# Patient Record
Sex: FEMALE | Race: WHITE | NOT HISPANIC OR LATINO | Employment: OTHER | URBAN - METROPOLITAN AREA
[De-identification: names, ages, dates, MRNs, and addresses within clinical notes are randomized per-mention and may not be internally consistent; named-entity substitution may affect disease eponyms.]

---

## 2023-12-21 ENCOUNTER — OFFICE VISIT (OUTPATIENT)
Dept: PRIMARY CARE | Facility: CLINIC | Age: 33
End: 2023-12-21
Payer: MEDICAID

## 2023-12-21 ENCOUNTER — TELEPHONE (OUTPATIENT)
Dept: PRIMARY CARE | Facility: CLINIC | Age: 33
End: 2023-12-21

## 2023-12-21 VITALS
OXYGEN SATURATION: 97 % | TEMPERATURE: 97.2 F | BODY MASS INDEX: 31.25 KG/M2 | SYSTOLIC BLOOD PRESSURE: 122 MMHG | HEIGHT: 69 IN | HEART RATE: 76 BPM | DIASTOLIC BLOOD PRESSURE: 80 MMHG | RESPIRATION RATE: 15 BRPM | WEIGHT: 211 LBS

## 2023-12-21 DIAGNOSIS — Z00.00 ROUTINE ADULT HEALTH MAINTENANCE: ICD-10-CM

## 2023-12-21 DIAGNOSIS — Z87.828 HISTORY OF TRAUMATIC HEAD INJURY: ICD-10-CM

## 2023-12-21 DIAGNOSIS — R41.3 MEMORY LOSS: Primary | ICD-10-CM

## 2023-12-21 PROCEDURE — 1036F TOBACCO NON-USER: CPT | Performed by: FAMILY MEDICINE

## 2023-12-21 PROCEDURE — 99385 PREV VISIT NEW AGE 18-39: CPT | Performed by: FAMILY MEDICINE

## 2023-12-21 NOTE — TELEPHONE ENCOUNTER
----- Message from Balwinder Valencia MD sent at 12/21/2023 11:06 AM EST -----  Please tell pt that I did some research on traumatic brain injury and she might try curcumin 1000 mg daily, B12 1000 mcg daily, Vit D 3000 U daily, zinc daily to see if it helps with memory.  Also might consider COQ-10 as well.

## 2023-12-21 NOTE — PROGRESS NOTES
Subjective   Patient ID: Ruthann Nichole is a 33 y.o. female who presents for New Patient Visit (Discuss memory loss,headaches x a few yrs, pt was kicked in head when young by horse ).  HPI patient presents to [facility] care.  She has a number of issues.  Most importantly she reports that she has been having some memory loss in recent years.  Seems to be getting worse.  She has history of getting kicked in the head by a horse when she was a teenager and has had multiple falls and head traumas since then.  She wears a helmet when she rides these days but works as a .  She reports her mood is overall good.  It is hard however when she forgets things that she needs to do and e patient also reports that her specially things that her daughter asked her to do.    Left shoulder gets loose at times.  There are multiple times when she has had it dislocated and she placed it back in place herself.    Obesity: Patient is try to lose weight.  She is very active and tries to eat a healthy diet.    There is no problem list on file for this patient.      Social Connections: Not on file       No current outpatient medications on file prior to visit.     No current facility-administered medications on file prior to visit.        Vitals:    12/21/23 1005   BP: 122/80   Pulse: 76   Resp: 15   Temp: 36.2 °C (97.2 °F)   SpO2: 97%     Vitals:    12/21/23 1005   Weight: 95.7 kg (211 lb)       Review of Systems   All other systems reviewed and are negative.      Objective     Physical Exam  Vitals reviewed.   Constitutional:       General: She is not in acute distress.     Appearance: Normal appearance. She is well-developed. She is not diaphoretic.   HENT:      Head: Normocephalic and atraumatic.      Right Ear: Tympanic membrane normal.      Left Ear: Tympanic membrane normal.      Nose: Nose normal.      Mouth/Throat:      Mouth: Mucous membranes are moist.   Eyes:      Pupils: Pupils are equal, round, and reactive to light.    Cardiovascular:      Rate and Rhythm: Normal rate and regular rhythm.      Heart sounds: Normal heart sounds. No murmur heard.     No friction rub. No gallop.   Pulmonary:      Effort: Pulmonary effort is normal.      Breath sounds: Normal breath sounds. No rales.   Abdominal:      General: Bowel sounds are normal.      Palpations: Abdomen is soft.      Tenderness: There is no abdominal tenderness.   Musculoskeletal:      Cervical back: Normal range of motion and neck supple.   Skin:     General: Skin is warm and dry.   Neurological:      Mental Status: She is alert.   Psychiatric:         Mood and Affect: Mood normal.         No visits with results within 2 Month(s) from this visit.   Latest known visit with results is:   [facility]acy Encounter on 06/02/2020   Component Date Value Ref Range Status    Joel Score 06/02/2020 0   Final    Comment: Interpretation of the Joel Score  0-3.....Normal vaginal microbiota  4-6.....Intermediate results  7-10....Bacterial vaginosis      Yeast 06/02/2020 ABSENT   Final    Clue Cells 06/02/2020 ABSENT   Final    Pathology Report 06/02/2020    Final                    Value:    Accession #: [id number]     Date of Procedure:  6/2/2020       Pathologist: [facility], Cytology  Date Reported: 6/8/2020  Date Received:  6/3/2020  Submitting Physician: Gabbi Knowles CNP                    FINAL CYTOLOGICAL INTERPRETATION        A.  THINPREP PAP Cervical / Endocervical Reflex - [facility] only:              Specimen adequacy:       SATISFACTORY FOR EVALUATION.       Quality Indicator: Endocervical/transformation zone component is present.              General Categorization:       NEGATIVE FOR INTRAEPITHELIAL LESION OR MALIGNANCY.              Ancillary Testing:       Specimen does not meet the requisition-stated criteria for HPV testing.  See Pap test interpretation above.         Electronically Signed Out By [facility], Cytology//LSM   By the signature  on this report, the individual or group listed as making the  Final Interpretation/Diagnosis certifies that they have reviewed this case.  Educational Note:  Cervical cytology i                          s a screening procedure primarily for squamous cancers and  precursors and has associated false-negative and false-positive results as  evidenced by published data.  Your patient's test should be interpreted in this  context, together with patient's history and clinical findings.  Regular  sampling and follow-up of unexplained clinical signs and symptoms are  recommended to minimize false negative results.         Clinical History  Date of Last Menstrual Period:     4/15/2020    Other Clinical Conditions:  HPV  Reflex for ASC-US only - Exclude HPV Genotype       Source of Specimen  A: THINPREP PAP Cervical / Endocervical Reflex - [facility] only            [facility]  Department of Pathology   [address] [place] [id number]        CONVERTED FINAL DIAGNOSIS 06/02/2020    Final                    Value:A.  THINPREP PAP Cervical / Endocervical Reflex - [facility] only:              Specimen adequacy:       SATISFACTORY FOR EVALUATION.       Quality Indicator: Endocervical/transformation zone component is present.              General Categorization:       NEGATIVE FOR INTRAEPITHELIAL LESION OR MALIGNANCY.              Ancillary Testing:       Specimen does not meet the requisition-stated criteria for HPV testing.  See Pap test interpretation above.             CONVERTED FINAL REPORT PDF LINK TO* 06/02/2020 [id number]   Final       Assessment/Plan   Problem List Items Addressed This Visit    None  Visit Diagnoses         Codes    Memory loss    -  Primary R41.3    Relevant Orders    CT head wo IV contrast    Referral to Physical Therapy    Comprehensive Metabolic Panel    Lipid Panel    CBC and Auto Differential    Vitamin B12    Vitamin D  25-Hydroxy,Total (for eval of Vitamin D levels)    TSH with reflex to Free T4 if abnormal    Magnesium    History of traumatic head injury     Z87.828    Relevant Orders    CT head wo IV contrast    Referral to Physical Therapy    Comprehensive Metabolic Panel    Lipid Panel    CBC and Auto Differential    Vitamin B12    Vitamin D 25-Hydroxy,Total (for eval of Vitamin D levels)    TSH with reflex to Free T4 if abnormal    Magnesium    Routine adult health maintenance     Z00.00    Relevant Orders    CT head wo IV contrast    Referral to Physical Therapy    Comprehensive Metabolic Panel    Lipid Panel    CBC and Auto Differential    Vitamin B12    Vitamin D 25-Hydroxy,Total (for eval of Vitamin D levels)    TSH with reflex to Free T4 if abnormal    Magnesium          Ordering CT scan of patient's head.  Concern for residual effects from traumatic brain injury.  Encouraged pt to consider finding a different kind of occupation which would limit her risks.   Ref to PT for shoulder.  Aim for 1700 ian strict diet x 6 days a week.      Try curcumin 1000 mg daily, B12 1000 mcg daily, Vit D 3000 U daily, zinc daily to see if it helps with memory.    Fu in 6 mo

## 2023-12-26 ENCOUNTER — LAB (OUTPATIENT)
Dept: LAB | Facility: LAB | Age: 33
End: 2023-12-26
Payer: MEDICAID

## 2023-12-26 DIAGNOSIS — Z00.00 ROUTINE ADULT HEALTH MAINTENANCE: ICD-10-CM

## 2023-12-26 DIAGNOSIS — R41.3 MEMORY LOSS: ICD-10-CM

## 2023-12-26 DIAGNOSIS — Z87.828 HISTORY OF TRAUMATIC HEAD INJURY: ICD-10-CM

## 2023-12-26 LAB
25(OH)D3 SERPL-MCNC: 23 NG/ML (ref 30–100)
ALBUMIN SERPL BCP-MCNC: 4.5 G/DL (ref 3.4–5)
ALP SERPL-CCNC: 68 U/L (ref 33–110)
ALT SERPL W P-5'-P-CCNC: 32 U/L (ref 7–45)
ANION GAP SERPL CALC-SCNC: 11 MMOL/L (ref 10–20)
AST SERPL W P-5'-P-CCNC: 23 U/L (ref 9–39)
BASOPHILS # BLD AUTO: 0.04 X10*3/UL (ref 0–0.1)
BASOPHILS NFR BLD AUTO: 0.7 %
BILIRUB SERPL-MCNC: 0.5 MG/DL (ref 0–1.2)
BUN SERPL-MCNC: 12 MG/DL (ref 6–23)
CALCIUM SERPL-MCNC: 9.5 MG/DL (ref 8.6–10.3)
CHLORIDE SERPL-SCNC: 104 MMOL/L (ref 98–107)
CHOLEST SERPL-MCNC: 230 MG/DL (ref 0–199)
CHOLESTEROL/HDL RATIO: 3.2
CO2 SERPL-SCNC: 26 MMOL/L (ref 21–32)
CREAT SERPL-MCNC: 0.81 MG/DL (ref 0.5–1.05)
EOSINOPHIL # BLD AUTO: 0.14 X10*3/UL (ref 0–0.7)
EOSINOPHIL NFR BLD AUTO: 2.3 %
ERYTHROCYTE [DISTWIDTH] IN BLOOD BY AUTOMATED COUNT: 12.2 % (ref 11.5–14.5)
GFR SERPL CREATININE-BSD FRML MDRD: >90 ML/MIN/1.73M*2
GLUCOSE SERPL-MCNC: 81 MG/DL (ref 74–99)
HCT VFR BLD AUTO: 43.3 % (ref 36–46)
HDLC SERPL-MCNC: 72.7 MG/DL
HGB BLD-MCNC: 13.8 G/DL (ref 12–16)
IMM GRANULOCYTES # BLD AUTO: 0.02 X10*3/UL (ref 0–0.7)
IMM GRANULOCYTES NFR BLD AUTO: 0.3 % (ref 0–0.9)
LDLC SERPL CALC-MCNC: 143 MG/DL
LYMPHOCYTES # BLD AUTO: 2.13 X10*3/UL (ref 1.2–4.8)
LYMPHOCYTES NFR BLD AUTO: 34.9 %
MAGNESIUM SERPL-MCNC: 2.1 MG/DL (ref 1.6–2.4)
MCH RBC QN AUTO: 29.8 PG (ref 26–34)
MCHC RBC AUTO-ENTMCNC: 31.9 G/DL (ref 32–36)
MCV RBC AUTO: 94 FL (ref 80–100)
MONOCYTES # BLD AUTO: 0.34 X10*3/UL (ref 0.1–1)
MONOCYTES NFR BLD AUTO: 5.6 %
NEUTROPHILS # BLD AUTO: 3.44 X10*3/UL (ref 1.2–7.7)
NEUTROPHILS NFR BLD AUTO: 56.2 %
NON HDL CHOLESTEROL: 157 MG/DL (ref 0–149)
NRBC BLD-RTO: 0 /100 WBCS (ref 0–0)
PLATELET # BLD AUTO: 218 X10*3/UL (ref 150–450)
POTASSIUM SERPL-SCNC: 4.5 MMOL/L (ref 3.5–5.3)
PROT SERPL-MCNC: 7 G/DL (ref 6.4–8.2)
RBC # BLD AUTO: 4.63 X10*6/UL (ref 4–5.2)
SODIUM SERPL-SCNC: 136 MMOL/L (ref 136–145)
TRIGL SERPL-MCNC: 70 MG/DL (ref 0–149)
TSH SERPL-ACNC: 1.82 MIU/L (ref 0.44–3.98)
VIT B12 SERPL-MCNC: 398 PG/ML (ref 211–911)
VLDL: 14 MG/DL (ref 0–40)
WBC # BLD AUTO: 6.1 X10*3/UL (ref 4.4–11.3)

## 2023-12-26 PROCEDURE — 36415 COLL VENOUS BLD VENIPUNCTURE: CPT

## 2023-12-26 PROCEDURE — 80053 COMPREHEN METABOLIC PANEL: CPT

## 2023-12-26 PROCEDURE — 80061 LIPID PANEL: CPT

## 2023-12-26 PROCEDURE — 84443 ASSAY THYROID STIM HORMONE: CPT

## 2023-12-26 PROCEDURE — 83735 ASSAY OF MAGNESIUM: CPT

## 2023-12-26 PROCEDURE — 85025 COMPLETE CBC W/AUTO DIFF WBC: CPT

## 2023-12-26 PROCEDURE — 82607 VITAMIN B-12: CPT

## 2023-12-26 PROCEDURE — 82306 VITAMIN D 25 HYDROXY: CPT

## 2023-12-26 NOTE — RESULT ENCOUNTER NOTE
Vit D levels are a bit low.  She can take 2000U daily.  Lipids are a bit high as well.  Losing weight would help improve those.  Other things look ok.

## 2023-12-27 ENCOUNTER — TELEPHONE (OUTPATIENT)
Dept: PRIMARY CARE | Facility: CLINIC | Age: 33
End: 2023-12-27
Payer: MEDICAID

## 2023-12-27 NOTE — TELEPHONE ENCOUNTER
----- Message from Balwinder Valencia MD sent at 12/26/2023  4:00 PM EST -----  Vit D levels are a bit low.  She can take 2000U daily.  Lipids are a bit high as well.  Losing weight would help improve those.  Other things look ok.

## 2023-12-27 NOTE — TELEPHONE ENCOUNTER
I informed pt of the bw res, she wanted to know if she should wait to take the meds that were prescribed or should she wait until after her CT scan, PT aware KAYLA out of office this week. Lazaro

## 2024-01-11 ENCOUNTER — TELEPHONE (OUTPATIENT)
Dept: PRIMARY CARE | Facility: CLINIC | Age: 34
End: 2024-01-11
Payer: MEDICAID

## 2024-01-11 ENCOUNTER — ANCILLARY PROCEDURE (OUTPATIENT)
Dept: RADIOLOGY | Facility: CLINIC | Age: 34
End: 2024-01-11
Payer: MEDICAID

## 2024-01-11 DIAGNOSIS — Z00.00 ROUTINE ADULT HEALTH MAINTENANCE: ICD-10-CM

## 2024-01-11 DIAGNOSIS — Z87.828 HISTORY OF TRAUMATIC HEAD INJURY: ICD-10-CM

## 2024-01-11 DIAGNOSIS — R41.3 MEMORY LOSS: ICD-10-CM

## 2024-01-11 PROCEDURE — 70450 CT HEAD/BRAIN W/O DYE: CPT | Performed by: RADIOLOGY

## 2024-01-11 PROCEDURE — 70450 CT HEAD/BRAIN W/O DYE: CPT

## 2024-01-11 NOTE — TELEPHONE ENCOUNTER
----- Message from Balwinder Valencia MD sent at 1/11/2024  1:13 PM EST -----  CAT scan was normal.  No abnormal findings

## 2024-03-21 ENCOUNTER — OFFICE VISIT (OUTPATIENT)
Dept: OBSTETRICS AND GYNECOLOGY | Facility: CLINIC | Age: 34
End: 2024-03-21
Payer: MEDICAID

## 2024-03-21 VITALS
BODY MASS INDEX: 32.43 KG/M2 | WEIGHT: 214 LBS | DIASTOLIC BLOOD PRESSURE: 68 MMHG | SYSTOLIC BLOOD PRESSURE: 110 MMHG | HEIGHT: 68 IN

## 2024-03-21 DIAGNOSIS — R10.2 PELVIC PAIN: Primary | ICD-10-CM

## 2024-03-21 LAB
BILIRUBIN, POC: NEGATIVE
BLOOD URINE, POC: POSITIVE
CLARITY, POC: ABNORMAL
COLOR, POC: YELLOW
GLUCOSE URINE, POC: NEGATIVE
KETONES, POC: POSITIVE
LEUKOCYTE EST, POC: NEGATIVE
NITRITE, POC: NEGATIVE
PH, POC: 7
POC APPEARANCE OF BODY FLUID: ABNORMAL
SPECIFIC GRAVITY, POC: 5
URINE PROTEIN, POC: NEGATIVE
UROBILINOGEN, POC: NEGATIVE

## 2024-03-21 PROCEDURE — 81002 URINALYSIS NONAUTO W/O SCOPE: CPT | Performed by: NURSE PRACTITIONER

## 2024-03-21 PROCEDURE — 87186 SC STD MICRODIL/AGAR DIL: CPT

## 2024-03-21 PROCEDURE — 99214 OFFICE O/P EST MOD 30 MIN: CPT | Performed by: NURSE PRACTITIONER

## 2024-03-21 PROCEDURE — 87086 URINE CULTURE/COLONY COUNT: CPT

## 2024-03-21 PROCEDURE — 1036F TOBACCO NON-USER: CPT | Performed by: NURSE PRACTITIONER

## 2024-03-21 ASSESSMENT — ENCOUNTER SYMPTOMS
MUSCULOSKELETAL NEGATIVE: 1
RESPIRATORY NEGATIVE: 1
EYES NEGATIVE: 1
ENDOCRINE NEGATIVE: 1
PSYCHIATRIC NEGATIVE: 1
GASTROINTESTINAL NEGATIVE: 1
NEUROLOGICAL NEGATIVE: 1
CARDIOVASCULAR NEGATIVE: 1
CONSTITUTIONAL NEGATIVE: 1

## 2024-03-21 NOTE — PROGRESS NOTES
Subjective   Patient ID: Ruthann Nichole is a 34 y.o. female who presents for Pelvic Pain (Complains of pelvic pain and cramping everyday for the 4 months. ).  34 year old here for complaints of having pelvic pain.  She first had the pain 4 months ago.  The pain has continued daily, but the intensity of the pain varies each day.  Sometimes the pain is a mild sharp pain, other times the pain is a more intense sharp pain and other times the pain feels like cramping when she is about to start her period, but she is not due for her period.  Her periods have been normal the last 4 months, but the pain does get more intense with the bleeding.  She denies any abnormal discharge, itching, and vaginal odor.  She has noticed her urine to be darker and to have a foul odor.  She denies any new partners,but does notice pain to intensify after she has intercourse for about 24 hours.  She also has some discomfort with intercourse but not enough that she has to stop.     Pelvic Pain  The patient's primary symptoms include pelvic pain.       Review of Systems   Constitutional: Negative.    HENT: Negative.     Eyes: Negative.    Respiratory: Negative.     Cardiovascular: Negative.    Gastrointestinal: Negative.    Endocrine: Negative.    Genitourinary:  Positive for pelvic pain.   Musculoskeletal: Negative.    Skin: Negative.    Neurological: Negative.    Psychiatric/Behavioral: Negative.         Objective   Physical Exam    Assessment/Plan   Problem List Items Addressed This Visit             ICD-10-CM    Pelvic pain - Primary R10.2    Relevant Orders    US PELVIS TRANSABDOMINAL WITH TRANSVAGINAL    Urine culture    POCT urinalysis dipstick manually resulted (Completed)   UAC&S sent  Pelvic ultrasound ordered, will treat pending results as needed  Follow up as needed       ZORAN Lizarraga 03/21/24 4:08 PM

## 2024-03-22 ENCOUNTER — TELEPHONE (OUTPATIENT)
Dept: OBSTETRICS AND GYNECOLOGY | Facility: CLINIC | Age: 34
End: 2024-03-22
Payer: MEDICAID

## 2024-03-22 NOTE — TELEPHONE ENCOUNTER
Patient reviewed an abnormal lab result in [facility], she is asking to receive a call back concerning this. Please advise

## 2024-03-24 LAB — BACTERIA UR CULT: ABNORMAL

## 2024-03-25 DIAGNOSIS — R10.2 PELVIC PAIN: Primary | ICD-10-CM

## 2024-03-25 RX ORDER — NITROFURANTOIN 25; 75 MG/1; MG/1
100 CAPSULE ORAL 2 TIMES DAILY
Qty: 14 CAPSULE | Refills: 0 | Status: SHIPPED | OUTPATIENT
Start: 2024-03-25 | End: 2024-04-01

## 2024-03-28 ENCOUNTER — HOSPITAL ENCOUNTER (OUTPATIENT)
Dept: RADIOLOGY | Facility: CLINIC | Age: 34
Discharge: HOME | End: 2024-03-28
Payer: MEDICAID

## 2024-03-28 DIAGNOSIS — R10.2 PELVIC PAIN: ICD-10-CM

## 2024-03-28 PROCEDURE — 76830 TRANSVAGINAL US NON-OB: CPT | Performed by: STUDENT IN AN ORGANIZED HEALTH CARE EDUCATION/TRAINING PROGRAM

## 2024-03-28 PROCEDURE — 76856 US EXAM PELVIC COMPLETE: CPT

## 2024-03-28 PROCEDURE — 76856 US EXAM PELVIC COMPLETE: CPT | Performed by: STUDENT IN AN ORGANIZED HEALTH CARE EDUCATION/TRAINING PROGRAM

## 2024-06-14 ENCOUNTER — TELEPHONE (OUTPATIENT)
Dept: OBSTETRICS AND GYNECOLOGY | Facility: CLINIC | Age: 34
End: 2024-06-14
Payer: MEDICAID

## 2024-06-14 DIAGNOSIS — N30.00 ACUTE CYSTITIS WITHOUT HEMATURIA: Primary | ICD-10-CM

## 2024-06-14 RX ORDER — NITROFURANTOIN 25; 75 MG/1; MG/1
100 CAPSULE ORAL 2 TIMES DAILY
Qty: 10 CAPSULE | Refills: 0 | Status: SHIPPED | OUTPATIENT
Start: 2024-06-14 | End: 2024-06-19

## 2024-06-14 NOTE — TELEPHONE ENCOUNTER
Urine culture  Order: [id number]   Collected 3/21/2024 16:17       Status: Final result       Visible to patient: Yes (seen)       Dx: Pelvic pain    Specimen Information: Clean Catch/Voided; Urine   1 Result Note       1 Follow-up Encounter  Urine Culture >100,000 Escherichia coli Abnormal            Resulting Agency: [facility]     Susceptibility     Escherichia coli     MICROSCAN    Ampicillin Susceptible    Cefazolin Susceptible    Cefazolin (uncomplicated UTIs only) Susceptible    Ciprofloxacin Susceptible    Gentamicin Susceptible    Nitrofurantoin Susceptible    Piperacillin/Tazobactam Susceptible    Trimethoprim/Sulfame                 Macrobid was prescribed

## 2024-06-14 NOTE — TELEPHONE ENCOUNTER
Patient was seen in March for a uti and she is now having the same issues. She is having cramping, urine smells and she is tired all the time she is wondering if something can be called in for her. She uses [facility] in [place]

## 2024-06-24 ENCOUNTER — APPOINTMENT (OUTPATIENT)
Dept: PRIMARY CARE | Facility: CLINIC | Age: 34
End: 2024-06-24
Payer: MEDICAID

## 2024-06-24 VITALS
DIASTOLIC BLOOD PRESSURE: 60 MMHG | OXYGEN SATURATION: 98 % | SYSTOLIC BLOOD PRESSURE: 112 MMHG | WEIGHT: 209 LBS | BODY MASS INDEX: 31.78 KG/M2 | HEART RATE: 50 BPM | TEMPERATURE: 97.9 F

## 2024-06-24 DIAGNOSIS — E66.09 CLASS 1 OBESITY DUE TO EXCESS CALORIES WITH SERIOUS COMORBIDITY AND BODY MASS INDEX (BMI) OF 31.0 TO 31.9 IN ADULT: Primary | ICD-10-CM

## 2024-06-24 PROCEDURE — 99214 OFFICE O/P EST MOD 30 MIN: CPT | Performed by: FAMILY MEDICINE

## 2024-06-24 PROCEDURE — 3008F BODY MASS INDEX DOCD: CPT | Performed by: FAMILY MEDICINE

## 2024-06-24 NOTE — PROGRESS NOTES
Subjective   Patient ID: Ruthann Nichole is a 34 y.o. female who presents for Memory Loss (Recheck ).  HPI  memory loss: has been stable.  HA's much improved since last time.      Obesity: weight has been stable.  Eating a healthy diet.    Chest pain: has been having random chest pains at times.  Has fhx for CAD.    Patient Active Problem List   Diagnosis    Pelvic pain       Social Connections: Not on file       Current Outpatient Medications on File Prior to Visit   Medication Sig Dispense Refill    [] nitrofurantoin, macrocrystal-monohydrate, (Macrobid) 100 mg capsule Take 1 capsule (100 mg) by mouth 2 times a day for 5 days. 10 capsule 0     No current facility-administered medications on file prior to visit.        Vitals:    24 1005   BP: 112/60   Pulse: 50   Temp: 36.6 °C (97.9 °F)   SpO2: 98%     Vitals:    24 1005   Weight: 94.8 kg (209 lb)       Review of Systems   All other systems reviewed and are negative.      Objective     Physical Exam  Vitals reviewed.   Constitutional:       General: She is not in acute distress.     Appearance: Normal appearance. She is well-developed. She is not diaphoretic.   HENT:      Head: Normocephalic and atraumatic.      Right Ear: Tympanic membrane normal.      Left Ear: Tympanic membrane normal.      Nose: Nose normal.      Mouth/Throat:      Mouth: Mucous membranes are moist.   Eyes:      Pupils: Pupils are equal, round, and reactive to light.   Cardiovascular:      Rate and Rhythm: Normal rate and regular rhythm.      Heart sounds: Normal heart sounds. No murmur heard.     No friction rub. No gallop.   Pulmonary:      Effort: Pulmonary effort is normal.      Breath sounds: Normal breath sounds. No rales.   Abdominal:      General: Bowel sounds are normal.      Palpations: Abdomen is soft.      Tenderness: There is no abdominal tenderness.   Musculoskeletal:      Cervical back: Normal range of motion and neck supple.   Skin:     General: Skin is warm and  dry.   Neurological:      Mental Status: She is alert.   Psychiatric:         Mood and Affect: Mood normal.         No visits with results within 2 Month(s) from this visit.   Latest known visit with results is:   Office Visit on 03/21/2024   Component Date Value Ref Range Status    Urine Culture 03/21/2024 >100,000 Escherichia coli (A)   Final    Color, UA 03/21/2024 Yellow   Final    Clarity, UA 03/21/2024 Cloudy   Final    Glucose, UA 03/21/2024 NEGATIVE   Final    Bilirubin, UA 03/21/2024 NEGATIVE   Final    Ketones, UA 03/21/2024 Positive   Final    Spec Grav, UA 03/21/2024 5.000   Final    Blood, UA 03/21/2024 Positive   Final    pH, UA 03/21/2024 7.0   Final    Protein, UA 03/21/2024 NEGATIVE   Final    Urobilinogen, UA 03/21/2024 NEGATIVE   Final    Leukocytes, UA 03/21/2024 NEGATIVE   Final    Nitrite, UA 03/21/2024 NEGATIVE   Final    Appearance, Fluid 03/21/2024 Cloudy   Final       Assessment/Plan   Problem List Items Addressed This Visit    None  Visit Diagnoses         Codes    Class 1 obesity due to excess calories with serious comorbidity and body mass index (BMI) of 31.0 to 31.9 in adult    -  Primary E66.09, Z68.31    Relevant Medications    semaglutide (Rybelsus) 14 mg tablet tablet          Try curcumin 1000 mg daily, B12 1000 mcg daily, Vit D 3000 U daily, zinc daily to see if it helps with memory.  Adding Rybelsus for weight loss.      Fu in 6 mo

## 2024-09-12 ENCOUNTER — OFFICE VISIT (OUTPATIENT)
Dept: PRIMARY CARE | Facility: CLINIC | Age: 34
End: 2024-09-12
Payer: MEDICAID

## 2024-09-12 VITALS
SYSTOLIC BLOOD PRESSURE: 122 MMHG | TEMPERATURE: 97.2 F | DIASTOLIC BLOOD PRESSURE: 74 MMHG | OXYGEN SATURATION: 98 % | HEART RATE: 68 BPM

## 2024-09-12 DIAGNOSIS — J30.9 CHRONIC ALLERGIC RHINITIS: Primary | ICD-10-CM

## 2024-09-12 PROCEDURE — 1036F TOBACCO NON-USER: CPT | Performed by: FAMILY MEDICINE

## 2024-09-12 PROCEDURE — 99213 OFFICE O/P EST LOW 20 MIN: CPT | Performed by: FAMILY MEDICINE

## 2024-09-12 RX ORDER — PREDNISONE 20 MG/1
40 TABLET ORAL DAILY
Qty: 14 TABLET | Refills: 0 | Status: SHIPPED | OUTPATIENT
Start: 2024-09-12 | End: 2024-09-19

## 2024-09-12 RX ORDER — AZELASTINE 1 MG/ML
1 SPRAY, METERED NASAL 2 TIMES DAILY
Qty: 30 ML | Refills: 2 | Status: SHIPPED | OUTPATIENT
Start: 2024-09-12

## 2024-09-12 ASSESSMENT — ENCOUNTER SYMPTOMS: HEADACHES: 1

## 2024-09-12 NOTE — PROGRESS NOTES
Subjective   Patient ID: Ruthann Nichole is a 34 y.o. female who presents for Earache, Nasal Congestion (And sinus pressure), and Headache (X 6 days).  Earache   Associated symptoms include headaches.   Headache   Associated symptoms include ear pain.     Pt presents with earache, sinus congestion and pressure x 6 days.  Was affected by ragweed this week.  No fevers or chills.  Pressure in ears.    Patient Active Problem List   Diagnosis    Pelvic pain       Social Connections: Not on file       Current Outpatient Medications on File Prior to Visit   Medication Sig Dispense Refill    semaglutide (Rybelsus) 14 mg tablet tablet Take 1 tablet (14 mg) by mouth once daily. 90 tablet 1     No current facility-administered medications on file prior to visit.        Vitals:    09/12/24 1048   BP: 122/74   Pulse: 68   Temp: 36.2 °C (97.2 °F)   SpO2: 98%     There were no vitals filed for this visit.    Review of Systems   HENT:  Positive for ear pain.    Neurological:  Positive for headaches.       Objective     Physical Exam  Vitals reviewed.   Constitutional:       General: She is not in acute distress.     Appearance: Normal appearance. She is well-developed. She is not diaphoretic.   HENT:      Head: Normocephalic and atraumatic.      Right Ear: Tympanic membrane normal.      Left Ear: Tympanic membrane normal.      Nose: Nose normal.      Mouth/Throat:      Mouth: Mucous membranes are moist.   Eyes:      Pupils: Pupils are equal, round, and reactive to light.   Cardiovascular:      Rate and Rhythm: Normal rate and regular rhythm.      Heart sounds: Normal heart sounds. No murmur heard.     No friction rub. No gallop.   Pulmonary:      Effort: Pulmonary effort is normal.      Breath sounds: Normal breath sounds. No rales.   Abdominal:      General: Bowel sounds are normal.      Palpations: Abdomen is soft.      Tenderness: There is no abdominal tenderness.   Musculoskeletal:      Cervical back: Normal range of motion and  neck supple.   Skin:     General: Skin is warm and dry.   Neurological:      Mental Status: She is alert.   Psychiatric:         Mood and Affect: Mood normal.         No visits with results within 2 Month(s) from this visit.   Latest known visit with results is:   Office Visit on 03/21/2024   Component Date Value Ref Range Status    Urine Culture 03/21/2024 >100,000 Escherichia coli (A)   Final    Color, UA 03/21/2024 Yellow   Final    Clarity, UA 03/21/2024 Cloudy   Final    Glucose, UA 03/21/2024 NEGATIVE   Final    Bilirubin, UA 03/21/2024 NEGATIVE   Final    Ketones, UA 03/21/2024 Positive   Final    Spec Grav, UA 03/21/2024 5.000   Final    Blood, UA 03/21/2024 Positive   Final    pH, UA 03/21/2024 7.0   Final    Protein, UA 03/21/2024 NEGATIVE   Final    Urobilinogen, UA 03/21/2024 NEGATIVE   Final    Leukocytes, UA 03/21/2024 NEGATIVE   Final    Nitrite, UA 03/21/2024 NEGATIVE   Final    Appearance, Fluid 03/21/2024 Cloudy   Final       Assessment/Plan   Problem List Items Addressed This Visit    None  Visit Diagnoses         Codes    Chronic allergic rhinitis    -  Primary J30.9    Relevant Medications    predniSONE (Deltasone) 20 mg tablet    azelastine (Astelin) 137 mcg (0.1 %) nasal spray          Giving prednisone burst and using Astelin spray for AR and ETD.

## 2024-12-23 ENCOUNTER — APPOINTMENT (OUTPATIENT)
Dept: PRIMARY CARE | Facility: CLINIC | Age: 34
End: 2024-12-23
Payer: MEDICAID

## 2025-01-15 ENCOUNTER — APPOINTMENT (OUTPATIENT)
Dept: PRIMARY CARE | Facility: CLINIC | Age: 35
End: 2025-01-15
Payer: MEDICAID

## 2025-01-15 VITALS
HEART RATE: 74 BPM | TEMPERATURE: 98.1 F | DIASTOLIC BLOOD PRESSURE: 78 MMHG | OXYGEN SATURATION: 99 % | SYSTOLIC BLOOD PRESSURE: 112 MMHG | BODY MASS INDEX: 35.12 KG/M2 | WEIGHT: 231 LBS

## 2025-01-15 DIAGNOSIS — E66.09 CLASS 1 OBESITY DUE TO EXCESS CALORIES WITH SERIOUS COMORBIDITY AND BODY MASS INDEX (BMI) OF 31.0 TO 31.9 IN ADULT: ICD-10-CM

## 2025-01-15 DIAGNOSIS — R41.3 MEMORY LOSS: ICD-10-CM

## 2025-01-15 DIAGNOSIS — Z00.00 ROUTINE ADULT HEALTH MAINTENANCE: ICD-10-CM

## 2025-01-15 DIAGNOSIS — E66.811 CLASS 1 OBESITY DUE TO EXCESS CALORIES WITH SERIOUS COMORBIDITY AND BODY MASS INDEX (BMI) OF 31.0 TO 31.9 IN ADULT: ICD-10-CM

## 2025-01-15 DIAGNOSIS — M25.519 SHOULDER PAIN, UNSPECIFIED CHRONICITY, UNSPECIFIED LATERALITY: Primary | ICD-10-CM

## 2025-01-15 DIAGNOSIS — M25.50 POLYARTHRALGIA: ICD-10-CM

## 2025-01-15 PROCEDURE — 99395 PREV VISIT EST AGE 18-39: CPT | Performed by: FAMILY MEDICINE

## 2025-01-15 PROCEDURE — 99213 OFFICE O/P EST LOW 20 MIN: CPT | Performed by: FAMILY MEDICINE

## 2025-01-15 PROCEDURE — 1036F TOBACCO NON-USER: CPT | Performed by: FAMILY MEDICINE

## 2025-01-15 NOTE — PROGRESS NOTES
Subjective   Patient ID: Ruthann Nichole is a 35 y.o. female who presents for Memory Loss (Recheck ).  HPI  Memory issues are about the same.  She wears a helmet when she rides these days but works as a .  She reports her mood is overall good.     Having a lot of joint pain.    Left shoulder gets loose at times.  There are multiple times when she has had it dislocated and she placed it back in place herself.    Obesity: Patient is try to lose weight.  She is very active and tries to eat a healthy diet.    Patient Active Problem List   Diagnosis    Pelvic pain       Social Connections: Not on file       Current Outpatient Medications on File Prior to Visit   Medication Sig Dispense Refill    azelastine (Astelin) 137 mcg (0.1 %) nasal spray Administer 1 spray into each nostril 2 times a day. Use in each nostril as directed (Patient not taking: Reported on 1/15/2025) 30 mL 2     No current facility-administered medications on file prior to visit.        Vitals:    01/15/25 1000   BP: 112/78   Pulse: 74   Temp: 36.7 °C (98.1 °F)   SpO2: 99%     Vitals:    01/15/25 1000   Weight: 105 kg (231 lb)       Review of Systems   All other systems reviewed and are negative.      Objective     Physical Exam  Vitals reviewed.   Constitutional:       General: She is not in acute distress.     Appearance: Normal appearance. She is well-developed. She is not diaphoretic.   HENT:      Head: Normocephalic and atraumatic.      Right Ear: Tympanic membrane normal.      Left Ear: Tympanic membrane normal.      Nose: Nose normal.      Mouth/Throat:      Mouth: Mucous membranes are moist.   Eyes:      Pupils: Pupils are equal, round, and reactive to light.   Cardiovascular:      Rate and Rhythm: Normal rate and regular rhythm.      Heart sounds: Normal heart sounds. No murmur heard.     No friction rub. No gallop.   Pulmonary:      Effort: Pulmonary effort is normal.      Breath sounds: Normal breath sounds. No rales.   Abdominal:       General: Bowel sounds are normal.      Palpations: Abdomen is soft.      Tenderness: There is no abdominal tenderness.   Musculoskeletal:      Cervical back: Normal range of motion and neck supple.   Skin:     General: Skin is warm and dry.   Neurological:      Mental Status: She is alert.   Psychiatric:         Mood and Affect: Mood normal.         No visits with results within 2 Month(s) from this visit.   Latest known visit with results is:   Office Visit on 03/21/2024   Component Date Value Ref Range Status    Urine Culture 03/21/2024 >100,000 Escherichia coli (A)   Final    Color, UA 03/21/2024 Yellow   Final    Clarity, UA 03/21/2024 Cloudy   Final    Glucose, UA 03/21/2024 NEGATIVE   Final    Bilirubin, UA 03/21/2024 NEGATIVE   Final    Ketones, UA 03/21/2024 Positive   Final    Spec Grav, UA 03/21/2024 5.000   Final    Blood, UA 03/21/2024 Positive   Final    pH, UA 03/21/2024 7.0   Final    Protein, UA 03/21/2024 NEGATIVE   Final    Urobilinogen, UA 03/21/2024 NEGATIVE   Final    Leukocytes, UA 03/21/2024 NEGATIVE   Final    Nitrite, UA 03/21/2024 NEGATIVE   Final    Appearance, Fluid 03/21/2024 Cloudy   Final       Assessment/Plan   Problem List Items Addressed This Visit    None  Visit Diagnoses         Codes    Shoulder pain, unspecified chronicity, unspecified laterality    -  Primary M25.519    Relevant Orders    Referral to Orthopaedic Surgery    Lipid Panel    Comprehensive Metabolic Panel    CBC and Auto Differential    Sedimentation rate, automated    BOZENA with Reflex to JUNAID    Uric acid    Routine adult health maintenance     Z00.00    Relevant Orders    Lipid Panel    Comprehensive Metabolic Panel    CBC and Auto Differential    Sedimentation rate, automated    BOZENA with Reflex to JUNAID    Uric acid          Trying low dose Zepbound to help pt to lose weight.   Eat small meals earlier in the day.  Checking autoimmune labs but strongly advised weight loss.      Fu in 6 mo

## 2025-01-20 ENCOUNTER — LAB (OUTPATIENT)
Dept: LAB | Facility: LAB | Age: 35
End: 2025-01-20
Payer: MEDICAID

## 2025-01-20 DIAGNOSIS — M25.519 SHOULDER PAIN, UNSPECIFIED CHRONICITY, UNSPECIFIED LATERALITY: ICD-10-CM

## 2025-01-20 DIAGNOSIS — Z00.00 ROUTINE ADULT HEALTH MAINTENANCE: ICD-10-CM

## 2025-01-20 DIAGNOSIS — M25.50 POLYARTHRALGIA: ICD-10-CM

## 2025-01-20 LAB
ALBUMIN SERPL BCP-MCNC: 4.7 G/DL (ref 3.4–5)
ALP SERPL-CCNC: 77 U/L (ref 33–110)
ALT SERPL W P-5'-P-CCNC: 23 U/L (ref 7–45)
ANION GAP SERPL CALC-SCNC: 14 MMOL/L (ref 10–20)
AST SERPL W P-5'-P-CCNC: 18 U/L (ref 9–39)
BASOPHILS # BLD AUTO: 0.05 X10*3/UL (ref 0–0.1)
BASOPHILS NFR BLD AUTO: 0.7 %
BILIRUB SERPL-MCNC: 0.8 MG/DL (ref 0–1.2)
BUN SERPL-MCNC: 21 MG/DL (ref 6–23)
CALCIUM SERPL-MCNC: 9.3 MG/DL (ref 8.6–10.3)
CHLORIDE SERPL-SCNC: 103 MMOL/L (ref 98–107)
CHOLEST SERPL-MCNC: 221 MG/DL (ref 0–199)
CHOLESTEROL/HDL RATIO: 3.5
CO2 SERPL-SCNC: 25 MMOL/L (ref 21–32)
CREAT SERPL-MCNC: 0.72 MG/DL (ref 0.5–1.05)
EGFRCR SERPLBLD CKD-EPI 2021: >90 ML/MIN/1.73M*2
EOSINOPHIL # BLD AUTO: 0.08 X10*3/UL (ref 0–0.7)
EOSINOPHIL NFR BLD AUTO: 1.1 %
ERYTHROCYTE [DISTWIDTH] IN BLOOD BY AUTOMATED COUNT: 12.1 % (ref 11.5–14.5)
ERYTHROCYTE [SEDIMENTATION RATE] IN BLOOD BY WESTERGREN METHOD: 31 MM/H (ref 0–20)
GLUCOSE SERPL-MCNC: 68 MG/DL (ref 74–99)
HCT VFR BLD AUTO: 43.8 % (ref 36–46)
HDLC SERPL-MCNC: 63.5 MG/DL
HGB BLD-MCNC: 13.8 G/DL (ref 12–16)
IMM GRANULOCYTES # BLD AUTO: 0.03 X10*3/UL (ref 0–0.7)
IMM GRANULOCYTES NFR BLD AUTO: 0.4 % (ref 0–0.9)
LDLC SERPL CALC-MCNC: 144 MG/DL
LYMPHOCYTES # BLD AUTO: 2.16 X10*3/UL (ref 1.2–4.8)
LYMPHOCYTES NFR BLD AUTO: 29.8 %
MCH RBC QN AUTO: 29.6 PG (ref 26–34)
MCHC RBC AUTO-ENTMCNC: 31.5 G/DL (ref 32–36)
MCV RBC AUTO: 94 FL (ref 80–100)
MONOCYTES # BLD AUTO: 0.51 X10*3/UL (ref 0.1–1)
MONOCYTES NFR BLD AUTO: 7 %
NEUTROPHILS # BLD AUTO: 4.42 X10*3/UL (ref 1.2–7.7)
NEUTROPHILS NFR BLD AUTO: 61 %
NON HDL CHOLESTEROL: 158 MG/DL (ref 0–149)
NRBC BLD-RTO: 0 /100 WBCS (ref 0–0)
PLATELET # BLD AUTO: 243 X10*3/UL (ref 150–450)
POTASSIUM SERPL-SCNC: 4.4 MMOL/L (ref 3.5–5.3)
PROT SERPL-MCNC: 7 G/DL (ref 6.4–8.2)
RBC # BLD AUTO: 4.66 X10*6/UL (ref 4–5.2)
SODIUM SERPL-SCNC: 138 MMOL/L (ref 136–145)
TRIGL SERPL-MCNC: 69 MG/DL (ref 0–149)
URATE SERPL-MCNC: 4.1 MG/DL (ref 2.3–6.7)
VLDL: 14 MG/DL (ref 0–40)
WBC # BLD AUTO: 7.3 X10*3/UL (ref 4.4–11.3)

## 2025-01-20 PROCEDURE — 86431 RHEUMATOID FACTOR QUANT: CPT

## 2025-01-20 PROCEDURE — 80053 COMPREHEN METABOLIC PANEL: CPT

## 2025-01-20 PROCEDURE — 86038 ANTINUCLEAR ANTIBODIES: CPT

## 2025-01-20 PROCEDURE — 85025 COMPLETE CBC W/AUTO DIFF WBC: CPT

## 2025-01-20 PROCEDURE — 80061 LIPID PANEL: CPT

## 2025-01-20 PROCEDURE — 85652 RBC SED RATE AUTOMATED: CPT

## 2025-01-20 PROCEDURE — 84550 ASSAY OF BLOOD/URIC ACID: CPT

## 2025-01-21 DIAGNOSIS — M25.50 POLYARTHRALGIA: Primary | ICD-10-CM

## 2025-01-21 LAB
ANA SER QL HEP2 SUBST: NEGATIVE
RHEUMATOID FACT SER NEPH-ACNC: <10 IU/ML (ref 0–15)

## 2025-01-21 NOTE — PROGRESS NOTES
Subjective   Patient ID: Ruthann Nichole is a 35 y.o. female who presents for No chief complaint on file..  HPI    Patient Active Problem List   Diagnosis    Pelvic pain       Social Connections: Not on file       Current Outpatient Medications on File Prior to Visit   Medication Sig Dispense Refill    azelastine (Astelin) 137 mcg (0.1 %) nasal spray Administer 1 spray into each nostril 2 times a day. Use in each nostril as directed (Patient not taking: Reported on 1/15/2025) 30 mL 2     No current facility-administered medications on file prior to visit.        There were no vitals filed for this visit.  There were no vitals filed for this visit.    Review of Systems    Objective     Physical Exam    Lab on 01/20/2025   Component Date Value Ref Range Status    Cholesterol 01/20/2025 221 (H)  0 - 199 mg/dL Final          Age      Desirable   Borderline High   High     0-19 Y     0 - 169       170 - 199     >/= 200    20-24 Y     0 - 189       190 - 224     >/= 225         >24 Y     0 - 199       200 - 239     >/= 240   **All ranges are based on fasting samples. Specific   therapeutic targets will vary based on patient-specific   cardiac risk.    Pediatric guidelines reference:Pediatrics 2011, 128(S5).Adult guidelines reference: NCEP ATPIII Guidelines,CHRISTO 2001, 258:2486-97    Venipuncture immediately after or during the administration of Metamizole may lead to falsely low results. Testing should be performed immediately prior to Metamizole dosing.    HDL-Cholesterol 01/20/2025 63.5  mg/dL Final      Age       Very Low   Low     Normal    High    0-19 Y    < 35      < 40     40-45     ----  20-24 Y    ----     < 40      >45      ----        >24 Y      ----     < 40     40-60      >60      Cholesterol/HDL Ratio 01/20/2025 3.5   Final      Ref Values  Desirable  < 3.4  High Risk  > 5.0    LDL Calculated 01/20/2025 144 (H)  <=99 mg/dL Final                                Near   Borderline      AGE      Desirable  Optimal     High     High     Very High     0-19 Y     0 - 109     ---    110-129   >/= 130     ----    20-24 Y     0 - 119     ---    120-159   >/= 160     ----      >24 Y     0 -  99   100-129  130-159   160-189     >/=190      VLDL 01/20/2025 14  0 - 40 mg/dL Final    Triglycerides 01/20/2025 69  0 - 149 mg/dL Final    Age              Desirable        Borderline         High        Very High  SEX:B           mg/dL             mg/dL               mg/dL      mg/dL  <=14D                       ----               ----        ----  15D-365D                    ----               ----        ----  1Y-9Y           0-74               75-99             >=100       ----  10Y-19Y        0-89                            >=130       ----  20Y-24Y        0-114             115-149             >=150      ----  >= 25Y         0-149             150-199             200-499    >=500      Venipuncture immediately after or during the administration of Metamizole may lead to falsely low results. Testing should be performed immediately prior to Metamizole dosing.    Non HDL Cholesterol 01/20/2025 158 (H)  0 - 149 mg/dL Final          Age       Desirable   Borderline High   High     Very High     0-19 Y     0 - 119       120 - 144     >/= 145    >/= 160    20-24 Y     0 - 149       150 - 189     >/= 190      ----         >24 Y    30 mg/dL above LDL Cholesterol goal      Glucose 01/20/2025 68 (L)  74 - 99 mg/dL Final    Sodium 01/20/2025 138  136 - 145 mmol/L Final    Potassium 01/20/2025 4.4  3.5 - 5.3 mmol/L Final    Chloride 01/20/2025 103  98 - 107 mmol/L Final    Bicarbonate 01/20/2025 25  21 - 32 mmol/L Final    Anion Gap 01/20/2025 14  10 - 20 mmol/L Final    Urea Nitrogen 01/20/2025 21  6 - 23 mg/dL Final    Creatinine 01/20/2025 0.72  0.50 - 1.05 mg/dL Final    eGFR 01/20/2025 >90  >60 mL/min/1.73m*2 Final    Calculations of estimated GFR are performed using the 2021 CKD-EPI Study Refit equation without the race variable for  the IDMS-Traceable creatinine methods.  https://jasn.asnjournals.org/content/early/2021/09/22/ASN.7711605959    Calcium 01/20/2025 9.3  8.6 - 10.3 mg/dL Final    Albumin 01/20/2025 4.7  3.4 - 5.0 g/dL Final    Alkaline Phosphatase 01/20/2025 77  33 - 110 U/L Final    Total Protein 01/20/2025 7.0  6.4 - 8.2 g/dL Final    AST 01/20/2025 18  9 - 39 U/L Final    Bilirubin, Total 01/20/2025 0.8  0.0 - 1.2 mg/dL Final    ALT 01/20/2025 23  7 - 45 U/L Final    Patients treated with Sulfasalazine may generate falsely decreased results for ALT.    WBC 01/20/2025 7.3  4.4 - 11.3 x10*3/uL Final    nRBC 01/20/2025 0.0  0.0 - 0.0 /100 WBCs Final    RBC 01/20/2025 4.66  4.00 - 5.20 x10*6/uL Final    Hemoglobin 01/20/2025 13.8  12.0 - 16.0 g/dL Final    Hematocrit 01/20/2025 43.8  36.0 - 46.0 % Final    MCV 01/20/2025 94  80 - 100 fL Final    MCH 01/20/2025 29.6  26.0 - 34.0 pg Final    MCHC 01/20/2025 31.5 (L)  32.0 - 36.0 g/dL Final    RDW 01/20/2025 12.1  11.5 - 14.5 % Final    Platelets 01/20/2025 243  150 - 450 x10*3/uL Final    Neutrophils % 01/20/2025 61.0  40.0 - 80.0 % Final    Immature Granulocytes %, Automated 01/20/2025 0.4  0.0 - 0.9 % Final    Immature Granulocyte Count (IG) includes promyelocytes, myelocytes and metamyelocytes but does not include bands. Percent differential counts (%) should be interpreted in the context of the absolute cell counts (cells/UL).    Lymphocytes % 01/20/2025 29.8  13.0 - 44.0 % Final    Monocytes % 01/20/2025 7.0  2.0 - 10.0 % Final    Eosinophils % 01/20/2025 1.1  0.0 - 6.0 % Final    Basophils % 01/20/2025 0.7  0.0 - 2.0 % Final    Neutrophils Absolute 01/20/2025 4.42  1.20 - 7.70 x10*3/uL Final    Percent differential counts (%) should be interpreted in the context of the absolute cell counts (cells/uL).    Immature Granulocytes Absolute, Au* 01/20/2025 0.03  0.00 - 0.70 x10*3/uL Final    Lymphocytes Absolute 01/20/2025 2.16  1.20 - 4.80 x10*3/uL Final    Monocytes Absolute  01/20/2025 0.51  0.10 - 1.00 x10*3/uL Final    Eosinophils Absolute 01/20/2025 0.08  0.00 - 0.70 x10*3/uL Final    Basophils Absolute 01/20/2025 0.05  0.00 - 0.10 x10*3/uL Final    Sedimentation Rate 01/20/2025 31 (H)  0 - 20 mm/h Final    BOZENA 01/20/2025 Negative  Negative Final    The Antinuclear Antibody (BOZENA) test was performed using  indirect immunofluorescence assay with HEp-2 cells slide.    Uric Acid 01/20/2025 4.1  2.3 - 6.7 mg/dL Final    Venipuncture immediately after or during the administration of Metamizole may lead to falsely low results. Testing should be performed immediately  prior to Metamizole dosing.       Assessment/Plan

## 2025-01-22 NOTE — RESULT ENCOUNTER NOTE
Patient's blood work looks overall pretty normal.  She did have a slightly elevated inflammatory marker that is a nonspecific finding, meaning that can be found in many different situations.  She might consider taking curcumin 1000 mg daily.  it has an anti-inflammatory effect and can be helpful for arthritis and pain.

## 2025-01-29 ENCOUNTER — APPOINTMENT (OUTPATIENT)
Dept: ORTHOPEDIC SURGERY | Age: 35
End: 2025-01-29
Payer: MEDICAID

## 2025-01-29 VITALS — HEIGHT: 68 IN | BODY MASS INDEX: 35.01 KG/M2 | WEIGHT: 231 LBS

## 2025-01-29 DIAGNOSIS — M25.511 RIGHT SHOULDER PAIN, UNSPECIFIED CHRONICITY: ICD-10-CM

## 2025-01-29 DIAGNOSIS — M25.311 SHOULDER INSTABILITY, RIGHT: ICD-10-CM

## 2025-01-29 DIAGNOSIS — S43.431A GLENOID LABRAL TEAR, RIGHT, INITIAL ENCOUNTER: ICD-10-CM

## 2025-01-29 DIAGNOSIS — M25.519 SHOULDER PAIN, UNSPECIFIED CHRONICITY, UNSPECIFIED LATERALITY: ICD-10-CM

## 2025-01-29 PROCEDURE — 99204 OFFICE O/P NEW MOD 45 MIN: CPT | Performed by: STUDENT IN AN ORGANIZED HEALTH CARE EDUCATION/TRAINING PROGRAM

## 2025-01-29 PROCEDURE — 1036F TOBACCO NON-USER: CPT | Performed by: STUDENT IN AN ORGANIZED HEALTH CARE EDUCATION/TRAINING PROGRAM

## 2025-01-29 PROCEDURE — 3008F BODY MASS INDEX DOCD: CPT | Performed by: STUDENT IN AN ORGANIZED HEALTH CARE EDUCATION/TRAINING PROGRAM

## 2025-01-29 NOTE — PROGRESS NOTES
PRIMARY CARE PHYSICIAN: Balwinder Valencia MD  REFERRING PROVIDER: Balwinder Valencia MD  [address]   [place] [id number]     CONSULT ORTHOPAEDIC: Shoulder Evaluation    ASSESSMENT & PLAN    Impression: 35 y.o. female with an acute right shoulder dislocation, chronic instability and concern for a displaced labrum tear.    Plan:   I explained to the patient the nature of their diagnosis.  I reviewed their imaging studies with them.    Based on the history, physical exam and imaging studies above, the patient's presentation is consistent with the above diagnosis.  I had a long discussion with the patient regarding their presentation and the treatment options.  We discussed initial nonoperative versus operative management options as well as potential further diagnostic imaging.  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  I do recommend surgical intervention to stabilize her shoulder.  However, we will require an MRI of the right shoulder to evaluate the status of her glenoid labrum and surrounding soft tissue as well as her glenoid bone stock to determine appropriate surgical intervention.  Again, this MRI is for preoperative planning.    The MRI is medically necessary and indicated given the patient's subjective complaints and physical exam findings as described below . The MRI will be used for potential presurgical planning purposes. The MRI should be performed in a hospital setting so the surgeon has immediate access to the images.      Follow-Up: Patient will follow-up after MRI    At the end of the visit, all questions were answered in full. The patient is in agreement with the plan and recommendations. They will call the office with any questions/concerns.    Note dictated with [employer] software. Completed without full typed error editing and sent to avoid delay.       SUBJECTIVE  CHIEF COMPLAINT: Right Shoulder Pain      HPI:  Ruthann Nichole is a 35 y.o. patient who presents today with Right Shoulder Pain. Patient has a history of multiple shoulder dislocations. This first happened when she was little and dislocated her shoulder while wrestling with her brother. This has now become much more frequent over the last couple years. It has progressed to the point where she dislocates on a weekly bases. Most recent dislocation was this past Monday while throwing hay. Patient reports inability to move the arm when dislocated and an obvious deformity. She has been able to reduce the shoulder herself on every occasion. Her instability and associated pain has become so significant it is affecting all daily activities. She is hesitant to do anything due to concern of another dislocation. Patient reports after she reduces her shoulder she wears a sling for the next day and takes ibuprofen for the pain.    XR Done Today   They deny any associated neck pain.  No numbness, tingling, or paresthesias.    REVIEW OF SYSTEMS  Constitutional: See HPI for pain assessment, No significant weight loss, recent trauma  Cardiovascular: No chest pain, shortness of breath  Respiratory: No difficulty breathing, cough  Gastrointestinal: No nausea, vomiting, diarrhea, constipation  Musculoskeletal: Noted in HPI, positive for pain, restricted motion, stiffness  Integumentary: No rashes, easy bruising, redness   Neurological: no numbness or tingling in extremities, no gait disturbances   Psychiatric: No mood changes, memory changes, social issues  Heme/Lymph: no excessive swelling, easy bruising, excessive bleeding  ENT: No hearing changes  Eyes: No vision changes    No past medical history on file.     No Known Allergies     Past Surgical History:   Procedure Laterality Date    OTHER SURGICAL HISTORY  11/05/2019    Tubal ligation    OTHER SURGICAL HISTORY  06/02/2020    Ovarian cystectomy        Family History   Problem Relation Name Age of Onset    Hypertension Father       "    Social History     Socioeconomic History    Marital status:      Spouse name: Not on file    Number of children: Not on file    Years of education: Not on file    Highest education level: Not on file   Occupational History    Not on file   Tobacco Use    Smoking status: Never    Smokeless tobacco: Never   Substance and Sexual Activity    Alcohol use: Never    Drug use: Never    Sexual activity: Not on file   Other Topics Concern    Not on file   Social History Narrative    Not on file     Social Drivers of Health     Financial Resource Strain: Not on file   Food Insecurity: Not on file   Transportation Needs: Not on file   Physical Activity: Not on file   Stress: Not on file   Social Connections: Not on file   Intimate Partner Violence: Not on file   Housing Stability: Not on file        CURRENT MEDICATIONS:   Current Outpatient Medications   Medication Sig Dispense Refill    azelastine (Astelin) 137 mcg (0.1 %) nasal spray Administer 1 spray into each nostril 2 times a day. Use in each nostril as directed (Patient not taking: Reported on 1/15/2025) 30 mL 2     No current facility-administered medications for this visit.        OBJECTIVE    PHYSICAL EXAM      6/16/2020    10:29 AM 7/6/2022    10:15 AM 12/21/2023    10:05 AM 3/21/2024     3:43 PM 6/24/2024    10:05 AM 9/12/2024    10:48 AM 1/15/2025    10:00 AM   Vitals   Systolic 102  122 110 112 122 112   Diastolic 64  80 68 60 74 78   Heart Rate   76  50 68 74   Temp   36.2 °C (97.2 °F)  36.6 °C (97.9 °F) 36.2 °C (97.2 °F) 36.7 °C (98.1 °F)   Resp   15       Height 1.727 m (5' 8\") 1.727 m (5' 8\") 1.753 m (5' 9\") 1.727 m (5' 8\")      Weight (lb) 221 192 211 214 209  231   BMI 33.6 kg/m2 29.19 kg/m2 31.16 kg/m2 32.54 kg/m2 31.78 kg/m2  35.12 kg/m2   BSA (m2) 2.19 m2 2.04 m2 2.16 m2 2.16 m2 2.13 m2  2.24 m2   Visit Report   Report Report Report Report Report      There is no height or weight on file to calculate BMI.    GENERAL: A/Ox3, NAD. Appears " healthy, well nourished  PSYCHIATRIC: Mood stable, appropriate memory recall  EYES: EOM intact, no scleral icterus  CARDIOVASCULAR: Palpable peripheral pulses  LUNGS: Breathing non-labored on room air  SKIN: no erythema, rashes, or ecchymosis     MUSCULOSKELETAL:  Laterality: right Shoulder Exam  - Negative Spurlings, full painless neck ROM  - Skin intact  - No erythema or warmth  - No ecchymosis or soft tissue swelling  - Shoulder ROM: Forward flexion 130 with apprehension, ER 45 with apprehension, IR upper lumbar  - Strength:      Jobes 5-/5     ER 5-/5     Belly press and bearhug 5-/5  - Palpation: Positive tenderness of the anterior and posterior joint lines  - Peacock and Neers equivocal  - Speeds and O'Briens positive  - Stability: Anterior/Posterior load and shift 1+ anterior and posterior with significant guarding  - Special Tests: Positive apprehension relocation, positive Thania and jerk    NEUROVASCULAR:  - Neurovascular Status: sensation intact to light touch distally, upper extremity motor grossly intact  - Capillary refill brisk at extremities, Bilateral peripheral pulses 2+    Imaging: Multiple views of the affected right shoulder(s) demonstrate: no acute osseous abnormality, no fracture, no significant degenerative changes.   X-rays were personally reviewed and interpreted by me.  Radiology reports were reviewed by me as well, if readily available at the time.      Adis Ledbetter MD  Attending Surgeon    Sports Medicine Orthopaedic Surgery  [facility] [place]  [facility] [employer]

## 2025-02-13 ENCOUNTER — HOSPITAL ENCOUNTER (OUTPATIENT)
Dept: RADIOLOGY | Facility: HOSPITAL | Age: 35
Discharge: HOME | End: 2025-02-13
Payer: MEDICAID

## 2025-02-13 DIAGNOSIS — S43.431A GLENOID LABRAL TEAR, RIGHT, INITIAL ENCOUNTER: ICD-10-CM

## 2025-02-13 DIAGNOSIS — M25.311 SHOULDER INSTABILITY, RIGHT: ICD-10-CM

## 2025-02-13 DIAGNOSIS — E66.09 CLASS 1 OBESITY DUE TO EXCESS CALORIES WITH SERIOUS COMORBIDITY AND BODY MASS INDEX (BMI) OF 31.0 TO 31.9 IN ADULT: Primary | ICD-10-CM

## 2025-02-13 DIAGNOSIS — E66.811 CLASS 1 OBESITY DUE TO EXCESS CALORIES WITH SERIOUS COMORBIDITY AND BODY MASS INDEX (BMI) OF 31.0 TO 31.9 IN ADULT: Primary | ICD-10-CM

## 2025-02-13 PROCEDURE — 73221 MRI JOINT UPR EXTREM W/O DYE: CPT | Mod: RT

## 2025-02-13 RX ORDER — TIRZEPATIDE 15 MG/.5ML
15 INJECTION, SOLUTION SUBCUTANEOUS WEEKLY
Qty: 2 ML | Refills: 2 | Status: SHIPPED | OUTPATIENT
Start: 2025-02-13

## 2025-02-13 NOTE — PROGRESS NOTES
Subjective   Patient ID: Ruthann Nichole is a 35 y.o. female who presents for No chief complaint on file..  HPI    Patient Active Problem List   Diagnosis    Pelvic pain       Social Connections: Not on file       Current Outpatient Medications on File Prior to Visit   Medication Sig Dispense Refill    azelastine (Astelin) 137 mcg (0.1 %) nasal spray Administer 1 spray into each nostril 2 times a day. Use in each nostril as directed (Patient not taking: Reported on 1/15/2025) 30 mL 2     No current facility-administered medications on file prior to visit.        There were no vitals filed for this visit.  There were no vitals filed for this visit.    Review of Systems    Objective     Physical Exam    Lab on 01/20/2025   Component Date Value Ref Range Status    Cholesterol 01/20/2025 221 (H)  0 - 199 mg/dL Final          Age      Desirable   Borderline High   High     0-19 Y     0 - 169       170 - 199     >/= 200    20-24 Y     0 - 189       190 - 224     >/= 225         >24 Y     0 - 199       200 - 239     >/= 240   **All ranges are based on fasting samples. Specific   therapeutic targets will vary based on patient-specific   cardiac risk.    Pediatric guidelines reference:Pediatrics 2011, 128(S5).Adult guidelines reference: NCEP ATPIII Guidelines,CHRISTO 2001, 258:2486-97    Venipuncture immediately after or during the administration of Metamizole may lead to falsely low results. Testing should be performed immediately prior to Metamizole dosing.    HDL-Cholesterol 01/20/2025 63.5  mg/dL Final      Age       Very Low   Low     Normal    High    0-19 Y    < 35      < 40     40-45     ----  20-24 Y    ----     < 40      >45      ----        >24 Y      ----     < 40     40-60      >60      Cholesterol/HDL Ratio 01/20/2025 3.5   Final      Ref Values  Desirable  < 3.4  High Risk  > 5.0    LDL Calculated 01/20/2025 144 (H)  <=99 mg/dL Final                                Near   Borderline      AGE      Desirable  Optimal     High     High     Very High     0-19 Y     0 - 109     ---    110-129   >/= 130     ----    20-24 Y     0 - 119     ---    120-159   >/= 160     ----      >24 Y     0 -  99   100-129  130-159   160-189     >/=190      VLDL 01/20/2025 14  0 - 40 mg/dL Final    Triglycerides 01/20/2025 69  0 - 149 mg/dL Final    Age              Desirable        Borderline         High        Very High  SEX:B           mg/dL             mg/dL               mg/dL      mg/dL  <=14D                       ----               ----        ----  15D-365D                    ----               ----        ----  1Y-9Y           0-74               75-99             >=100       ----  10Y-19Y        0-89                            >=130       ----  20Y-24Y        0-114             115-149             >=150      ----  >= 25Y         0-149             150-199             200-499    >=500      Venipuncture immediately after or during the administration of Metamizole may lead to falsely low results. Testing should be performed immediately prior to Metamizole dosing.    Non HDL Cholesterol 01/20/2025 158 (H)  0 - 149 mg/dL Final          Age       Desirable   Borderline High   High     Very High     0-19 Y     0 - 119       120 - 144     >/= 145    >/= 160    20-24 Y     0 - 149       150 - 189     >/= 190      ----         >24 Y    30 mg/dL above LDL Cholesterol goal      Glucose 01/20/2025 68 (L)  74 - 99 mg/dL Final    Sodium 01/20/2025 138  136 - 145 mmol/L Final    Potassium 01/20/2025 4.4  3.5 - 5.3 mmol/L Final    Chloride 01/20/2025 103  98 - 107 mmol/L Final    Bicarbonate 01/20/2025 25  21 - 32 mmol/L Final    Anion Gap 01/20/2025 14  10 - 20 mmol/L Final    Urea Nitrogen 01/20/2025 21  6 - 23 mg/dL Final    Creatinine 01/20/2025 0.72  0.50 - 1.05 mg/dL Final    eGFR 01/20/2025 >90  >60 mL/min/1.73m*2 Final    Calculations of estimated GFR are performed using the 2021 CKD-EPI Study Refit equation without the race variable for  the IDMS-Traceable creatinine methods.  https://jasn.asnjournals.org/content/early/2021/09/22/ASN.0306235217    Calcium 01/20/2025 9.3  8.6 - 10.3 mg/dL Final    Albumin 01/20/2025 4.7  3.4 - 5.0 g/dL Final    Alkaline Phosphatase 01/20/2025 77  33 - 110 U/L Final    Total Protein 01/20/2025 7.0  6.4 - 8.2 g/dL Final    AST 01/20/2025 18  9 - 39 U/L Final    Bilirubin, Total 01/20/2025 0.8  0.0 - 1.2 mg/dL Final    ALT 01/20/2025 23  7 - 45 U/L Final    Patients treated with Sulfasalazine may generate falsely decreased results for ALT.    WBC 01/20/2025 7.3  4.4 - 11.3 x10*3/uL Final    nRBC 01/20/2025 0.0  0.0 - 0.0 /100 WBCs Final    RBC 01/20/2025 4.66  4.00 - 5.20 x10*6/uL Final    Hemoglobin 01/20/2025 13.8  12.0 - 16.0 g/dL Final    Hematocrit 01/20/2025 43.8  36.0 - 46.0 % Final    MCV 01/20/2025 94  80 - 100 fL Final    MCH 01/20/2025 29.6  26.0 - 34.0 pg Final    MCHC 01/20/2025 31.5 (L)  32.0 - 36.0 g/dL Final    RDW 01/20/2025 12.1  11.5 - 14.5 % Final    Platelets 01/20/2025 243  150 - 450 x10*3/uL Final    Neutrophils % 01/20/2025 61.0  40.0 - 80.0 % Final    Immature Granulocytes %, Automated 01/20/2025 0.4  0.0 - 0.9 % Final    Immature Granulocyte Count (IG) includes promyelocytes, myelocytes and metamyelocytes but does not include bands. Percent differential counts (%) should be interpreted in the context of the absolute cell counts (cells/UL).    Lymphocytes % 01/20/2025 29.8  13.0 - 44.0 % Final    Monocytes % 01/20/2025 7.0  2.0 - 10.0 % Final    Eosinophils % 01/20/2025 1.1  0.0 - 6.0 % Final    Basophils % 01/20/2025 0.7  0.0 - 2.0 % Final    Neutrophils Absolute 01/20/2025 4.42  1.20 - 7.70 x10*3/uL Final    Percent differential counts (%) should be interpreted in the context of the absolute cell counts (cells/uL).    Immature Granulocytes Absolute, Au* 01/20/2025 0.03  0.00 - 0.70 x10*3/uL Final    Lymphocytes Absolute 01/20/2025 2.16  1.20 - 4.80 x10*3/uL Final    Monocytes Absolute  01/20/2025 0.51  0.10 - 1.00 x10*3/uL Final    Eosinophils Absolute 01/20/2025 0.08  0.00 - 0.70 x10*3/uL Final    Basophils Absolute 01/20/2025 0.05  0.00 - 0.10 x10*3/uL Final    Sedimentation Rate 01/20/2025 31 (H)  0 - 20 mm/h Final    BOZENA 01/20/2025 Negative  Negative Final    The Antinuclear Antibody (BOZENA) test was performed using  indirect immunofluorescence assay with HEp-2 cells slide.    Uric Acid 01/20/2025 4.1  2.3 - 6.7 mg/dL Final    Venipuncture immediately after or during the administration of Metamizole may lead to falsely low results. Testing should be performed immediately  prior to Metamizole dosing.    Rheumatoid Factor 01/20/2025 <10  0 - 15 IU/mL Final       Assessment/Plan

## 2025-02-17 ENCOUNTER — APPOINTMENT (OUTPATIENT)
Dept: ORTHOPEDIC SURGERY | Facility: CLINIC | Age: 35
End: 2025-02-17
Payer: MEDICAID

## 2025-02-17 ENCOUNTER — TELEPHONE (OUTPATIENT)
Dept: ORTHOPEDIC SURGERY | Facility: CLINIC | Age: 35
End: 2025-02-17

## 2025-02-17 VITALS — HEIGHT: 68 IN | BODY MASS INDEX: 32.43 KG/M2 | WEIGHT: 214 LBS

## 2025-02-17 DIAGNOSIS — M25.311 INSTABILITY OF RIGHT SHOULDER JOINT: Primary | ICD-10-CM

## 2025-02-17 DIAGNOSIS — M65.911 SYNOVITIS OF RIGHT SHOULDER: ICD-10-CM

## 2025-02-17 DIAGNOSIS — S43.431A GLENOID LABRAL TEAR, RIGHT, INITIAL ENCOUNTER: ICD-10-CM

## 2025-02-17 DIAGNOSIS — S42.291A HILL SACHS DEFORMITY, RIGHT: ICD-10-CM

## 2025-02-17 PROCEDURE — 3008F BODY MASS INDEX DOCD: CPT | Performed by: STUDENT IN AN ORGANIZED HEALTH CARE EDUCATION/TRAINING PROGRAM

## 2025-02-17 PROCEDURE — 99214 OFFICE O/P EST MOD 30 MIN: CPT | Performed by: STUDENT IN AN ORGANIZED HEALTH CARE EDUCATION/TRAINING PROGRAM

## 2025-02-17 PROCEDURE — 1036F TOBACCO NON-USER: CPT | Performed by: STUDENT IN AN ORGANIZED HEALTH CARE EDUCATION/TRAINING PROGRAM

## 2025-02-17 PROCEDURE — L3670 SO ACRO/CLAV CAN WEB PRE OTS: HCPCS | Performed by: STUDENT IN AN ORGANIZED HEALTH CARE EDUCATION/TRAINING PROGRAM

## 2025-02-17 ASSESSMENT — PAIN - FUNCTIONAL ASSESSMENT: PAIN_FUNCTIONAL_ASSESSMENT: NO/DENIES PAIN

## 2025-02-17 NOTE — PROGRESS NOTES
PRIMARY CARE PHYSICIAN: Balwinder Valencia MD  REFERRING PROVIDER: No referring provider defined for this encounter.     CONSULT ORTHOPAEDIC: Shoulder Evaluation    ASSESSMENT & PLAN    Impression: 35 y.o. female with Right shoulder recurrent anterior instability, glenoid labrum tearing, large Hill-Sachs defect, glenohumeral synovitis.    Plan:   I explained to the patient the nature of their diagnosis.  I reviewed their imaging studies with them.    Based on the history, physical exam and imaging studies above, the patient's presentation is consistent with the above diagnosis.  I had a long discussion with the patient regarding their presentation and the treatment options.  We discussed initial nonoperative versus operative management options as well as potential further diagnostic imaging.  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  I do recommend surgical intervention to stabilize her shoulder.  I reviewed her MRI findings with her.  Her MRI demonstrates findings consistent with chronic anterior instability with a fairly large Hill-Sachs defect.  After long discussion with the patient, she elected to proceed with surgical intervention of form of a right shoulder arthroscopy, anterior stabilization, labral repair, capsulorrhaphy, remplissage, extensive intra-articular debridement and synovectomy.  I thoroughly explained the risks and benefits as well as the expected postoperative timeline for the proposed procedure versus nonoperative management. Risks of this procedure include but are not limited to bleeding, infection, nerve injury, DVT and failure of repair or implant.  The patient expressed understanding and wished to proceed with surgical intervention.  All questions were answered. We will begin the presurgical process and find them a surgical date in a timely fashion that works for them.    The patient will require an abduction sling for postoperative joint  stability. Additionally, in order to decrease the amount of narcotic pain medication usage and improve their pain control and swelling, I recommend a cryotherapy machine.    At the end of the visit, all questions were answered in full. The patient is in agreement with the plan and recommendations. They will call the office with any questions/concerns.    Note dictated with Surgient software. Completed without full typed error editing and sent to avoid delay.       SUBJECTIVE  CHIEF COMPLAINT: Right Shoulder Pain      HPI: Ruthann Nichole is a 35 y.o. patient who presents today with Right Shoulder Pain. She had an MRI done 2/13/2025.  She continues to have pain and dysfunction in the right shoulder.  She notes that it affects all of her daily activities.  Please see below for full history from prior visit.    1/29/2025 HPI Visit Info:   Patient has a history of multiple shoulder dislocations. This first happened when she was little and dislocated her shoulder while wrestling with her brother. This has now become much more frequent over the last couple years. It has progressed to the point where she dislocates on a weekly bases. Most recent dislocation was this past Monday while throwing hay. Patient reports inability to move the arm when dislocated and an obvious deformity. She has been able to reduce the shoulder herself on every occasion. Her instability and associated pain has become so significant it is affecting all daily activities. She is hesitant to do anything due to concern of another dislocation. Patient reports after she reduces her shoulder she wears a sling for the next day and takes ibuprofen for the pain.    XR Done Today   They deny any associated neck pain.  No numbness, tingling, or paresthesias.    REVIEW OF SYSTEMS  Constitutional: See HPI for pain assessment, No significant weight loss, recent trauma  Cardiovascular: No chest pain, shortness of breath  Respiratory: No difficulty  breathing, cough  Gastrointestinal: No nausea, vomiting, diarrhea, constipation  Musculoskeletal: Noted in HPI, positive for pain, restricted motion, stiffness  Integumentary: No rashes, easy bruising, redness   Neurological: no numbness or tingling in extremities, no gait disturbances   Psychiatric: No mood changes, memory changes, social issues  Heme/Lymph: no excessive swelling, easy bruising, excessive bleeding  ENT: No hearing changes  Eyes: No vision changes    No past medical history on file.     No Known Allergies     Past Surgical History:   Procedure Laterality Date    OTHER SURGICAL HISTORY  11/05/2019    Tubal ligation    OTHER SURGICAL HISTORY  06/02/2020    Ovarian cystectomy        Family History   Problem Relation Name Age of Onset    Hypertension Father          Social History     Socioeconomic History    Marital status:      Spouse name: Not on file    Number of children: Not on file    Years of education: Not on file    Highest education level: Not on file   Occupational History    Not on file   Tobacco Use    Smoking status: Never    Smokeless tobacco: Never   Substance and Sexual Activity    Alcohol use: Never    Drug use: Never    Sexual activity: Not on file   Other Topics Concern    Not on file   Social History Narrative    Not on file     Social Drivers of Health     Financial Resource Strain: Not on file   Food Insecurity: Not on file   Transportation Needs: Not on file   Physical Activity: Not on file   Stress: Not on file   Social Connections: Not on file   Intimate Partner Violence: Not on file   Housing Stability: Not on file        CURRENT MEDICATIONS:   Current Outpatient Medications   Medication Sig Dispense Refill    tirzepatide (Mounjaro) 15 mg/0.5 mL pen injector Inject 15 mg under the skin 1 (one) time per week. Compound with B12 1000 mcg weekly 2 mL 2    azelastine (Astelin) 137 mcg (0.1 %) nasal spray Administer 1 spray into each nostril 2 times a day. Use in each  "nostril as directed (Patient not taking: Reported on 1/15/2025) 30 mL 2     No current facility-administered medications for this visit.        OBJECTIVE    PHYSICAL EXAM      3/21/2024     3:43 PM 6/24/2024    10:05 AM 9/12/2024    10:48 AM 1/15/2025    10:00 AM 1/29/2025    11:45 AM 2/13/2025    10:28 AM 2/17/2025    10:24 AM   Vitals   Systolic 110 112 122 112      Diastolic 68 60 74 78      Heart Rate  50 68 74      Temp  36.6 °C (97.9 °F) 36.2 °C (97.2 °F) 36.7 °C (98.1 °F)      Height 1.727 m (5' 8\")    1.727 m (5' 8\")  1.727 m (5' 8\")   Weight (lb) 214 209  231 231 216 214   BMI 32.54 kg/m2 31.78 kg/m2  35.12 kg/m2 35.12 kg/m2 32.84 kg/m2 32.54 kg/m2   BSA (m2) 2.16 m2 2.13 m2  2.24 m2 2.24 m2 2.17 m2 2.16 m2   Visit Report Report Report Report Report Report  Report      Body mass index is 32.54 kg/m².    GENERAL: A/Ox3, NAD. Appears healthy, well nourished  PSYCHIATRIC: Mood stable, appropriate memory recall  EYES: EOM intact, no scleral icterus  CARDIOVASCULAR: Palpable peripheral pulses  LUNGS: Breathing non-labored on room air  SKIN: no erythema, rashes, or ecchymosis     MUSCULOSKELETAL:  Laterality: right Shoulder Exam  - Negative Spurlings, full painless neck ROM  - Skin intact  - No erythema or warmth  - No ecchymosis or soft tissue swelling  - Shoulder ROM: Forward flexion 130 with apprehension, ER 45 with apprehension, IR upper lumbar  - Strength:      Jobes 5-/5     ER 5-/5     Belly press and bearhug 5-/5  - Palpation: Positive tenderness of the anterior and posterior joint lines  - Peacock and Neers equivocal  - Speeds and O'Briens positive  - Stability: Anterior/Posterior load and shift 1+ anterior with significant guarding, stable posterior  - Special Tests: Positive apprehension relocation, equivocal Thania and ruiz    NEUROVASCULAR:  - Neurovascular Status: sensation intact to light touch distally, upper extremity motor grossly intact  - Capillary refill brisk at extremities, Bilateral " peripheral pulses 2+    Imaging: MRI of the right shoulder reviewed by me demonstrates findings consistent with chronic anterior instability with a large Hill-Sachs defect, anterior-inferior glenoid labrum tearing, no evidence of glenoid bone loss, capacious anterior capsule, rotator cuff intact, glenohumeral synovitis.  Images were personally reviewed and interpreted by me.  Radiology reports were reviewed by me as well, if readily available at the time.      Adis Ledbetter MD  Attending Surgeon    Sports Medicine Orthopaedic Surgery  [facility] [place]  [facility] [employer]

## 2025-02-18 DIAGNOSIS — M25.311 INSTABILITY OF RIGHT SHOULDER JOINT: ICD-10-CM

## 2025-02-18 DIAGNOSIS — S43.431A GLENOID LABRUM TEAR, RIGHT, INITIAL ENCOUNTER: Primary | ICD-10-CM

## 2025-02-18 DIAGNOSIS — M65.911 SYNOVITIS OF RIGHT SHOULDER: ICD-10-CM

## 2025-02-18 DIAGNOSIS — S42.291A HILL SACHS DEFORMITY, RIGHT: ICD-10-CM

## 2025-02-18 NOTE — TELEPHONE ENCOUNTER
Please submit case for 4/18/25. Ruthann Nichole was provided with all surgery information and hold has been placed on calendar.

## 2025-03-13 ENCOUNTER — CLINICAL SUPPORT (OUTPATIENT)
Dept: PREADMISSION TESTING | Facility: HOSPITAL | Age: 35
End: 2025-03-13
Payer: MEDICAID

## 2025-03-13 VITALS — WEIGHT: 211 LBS | BODY MASS INDEX: 31.25 KG/M2 | HEIGHT: 69 IN

## 2025-03-13 DIAGNOSIS — S43.431A GLENOID LABRAL TEAR, RIGHT, INITIAL ENCOUNTER: ICD-10-CM

## 2025-03-13 RX ORDER — NAPROXEN 500 MG/1
500 TABLET ORAL 2 TIMES DAILY PRN
COMMUNITY
Start: 2025-03-04 | End: 2025-03-14

## 2025-03-13 RX ORDER — LANOLIN ALCOHOL/MO/W.PET/CERES
1000 CREAM (GRAM) TOPICAL
COMMUNITY

## 2025-03-13 ASSESSMENT — ENCOUNTER SYMPTOMS
WOUND: 0
CHILLS: 0
AGITATION: 0
VOMITING: 0
FEVER: 0
NERVOUS/ANXIOUS: 0
CONFUSION: 0
NAUSEA: 0
COUGH: 0
SHORTNESS OF BREATH: 0
RHINORRHEA: 0
DIARRHEA: 0
ABDOMINAL PAIN: 0
EYE DISCHARGE: 0

## 2025-03-13 NOTE — PERIOPERATIVE NURSING NOTE
PAT PRE-OPERATIVE INSTRUCTIONS    [facility]  [address] [place] [address]  [phone]    Please let your surgeon know if:      You develop:  Open sores, shingles, burning or painful urination as these may increase your risk of an infection.   Fever=100.4 or greater   New or worsening cold or flu symptoms ( cough, shortness of breath, sore throat, respiratory distress, headache, fatigue, GI symptoms)   You no longer wish to have the surgery.   Any other personal circumstances change that may lead to the need to cancel or defer this surgery-such as being sick or getting admitted to any hospital within one week of your planned procedure.    Your contact details change, such as a change of address or phone number.    Starting now:     Please DO NOT drink alcohol or smoke for 24 hours before surgery. It is well known that quitting smoking can make a huge difference to your health and recovery from surgery. The longer you abstain from smoking, the better your chances of a healthy recovery. If you need help with quitting, call 1-800-QUIT-NOW to be connected to a trained counselor who will discuss the best methods to help you quit.     Before your surgery:    Please stop all supplements/ vitamins 7 days prior to surgery (or as directed by your surgeon).   Please stop taking NSAID pain medicine such as Advil, Ibuprofen, and Motrin 7 days before surgery.    If you develop any fever, cough, cold, rashes, cuts, scratches, scrapes, urinary symptoms or infection anywhere on your body (including teeth and gums) prior to surgery, please call your surgeon’s office as soon as possible. This may require treatment to reduce the chance of cancellation on the day of surgery.    The day before your surgery:   DIET- Do not eat any food after MIDNIGHT.   Get a good night’s rest.  Use the special soap for bathing if you have been instructed to use one.    Scheduled surgery times may change  and you will be notified if this occurs - please check your personal voicemail for any updates.     On the morning of surgery:   Wear comfortable, loose fitting clothes which open in the front.   Shower and please do not wear moisturizers, creams, lotions, deodorants, makeup or perfume.    Please bring with you to surgery:   Photo ID and insurance card   Current list of medicines and allergies   Pacemaker/ Defibrillator/Heart stent cards as well as remote controls for implanted devices    CPAP machine and mask    Slings/ splints/ crutches   A copy of your complete advanced directive/DHPOA.    Please do NOT bring with you to surgery:   All jewelry and valuables should be left at home.   Prosthetic devices such as contact lenses, glasses, hearing aids, dentures, eyelash extensions, hairpins and body piercings must be removed prior to going in to the surgical suite. If you have a case for these items, please bring it with you on surgery day.    *Patients under the age of 18: A responsible adult must be present and remaining in the building throughout the surgical visit.    After outpatient surgery:   A responsible adult MUST accompany you at the time of discharge and stay with you for 24 hours after your surgery. You may NOT drive yourself home after surgery.    Do not drive, operate machinery, make critical decisions or do activities that require co-ordination or balance until after a night’s sleep.   Do not drink alcoholic beverages for 24 hours.   Instructions for resuming your medications will be provided by your surgeon.    CALL YOUR DOCTOR AFTER SURGERY IF YOU HAVE:     Chills and/or a fever of 101° F or higher.    Redness, swelling, pus or drainage from your surgical wound or a bad smell from the wound.    Lightheadedness, fainting or confusion.    Persistent vomiting (throwing up) and are not able to eat or drink for 12 hours.    Three or more loose, watery bowel movements in 24 hours (diarrhea).   Difficulty  or pain while urinating( after non-urological surgery)    Pain and swelling in your legs, especially if it is only on one side.    Difficulty breathing or are breathing faster than normal.    Any new concerning symptoms.      Reviewed pre-op instructions with patient, states understanding and denies further questions at this time.      Take Care Ruthann!

## 2025-03-13 NOTE — CPM/PAT NURSE NOTE
CPM/PAT Nurse Note      Name: Ruthann Nichole (Ruthann Nichole)  /Age: 1990/35 y.o.       Past Medical History:   Diagnosis Date    Urinary tract infection        Past Surgical History:   Procedure Laterality Date    OTHER SURGICAL HISTORY  2019    Tubal ligation    OTHER SURGICAL HISTORY  2020    Ovarian cystectomy       Patient Sexual activity questions deferred to the physician.    Family History   Problem Relation Name Age of Onset    Thyroid disease Mother      Heart disease Father          CABG x3 ()    Hypertension Father      COPD Maternal Grandmother         No Known Allergies    Prior to Admission medications    Medication Sig Start Date End Date Taking? Authorizing Provider   cyanocobalamin (Vitamin B-12) 1,000 mcg tablet Take 1 tablet (1,000 mcg) by mouth 4 times a week.   Yes Historical Provider, MD   naproxen (Naprosyn) 500 mg tablet Take 1 tablet (500 mg) by mouth 2 times a day as needed for moderate pain (4 - 6). 3/4/25 3/14/25 Yes Historical Provider, MD   tirzepatide (Mounjaro) 15 mg/0.5 mL pen injector Inject 15 mg under the skin 1 (one) time per week. Compound with B12 1000 mcg weekly  Patient taking differently: Inject 15 mg under the skin 1 (one) time per week. Compound with B12 1000 mcg weekly; 3-13-25 pt reports taking 2.5mg every Thursday for weight loss 25  Yes Balwinder Valencia MD   azelastine (Astelin) 137 mcg (0.1 %) nasal spray Administer 1 spray into each nostril 2 times a day. Use in each nostril as directed  Patient not taking: Reported on 1/15/2025 9/12/24 3/13/25  Balwinder Valencia MD        PAT ROS:   Constitutional:    no fever   no chills  Neuro/Psych:    no agitation   no confusion   not nervous/anxious  Eyes:    no discharge  Ears:    no ear discharge  Nose:    no nasal discharge  Mouth:   Throat:    no throat pain  Neck:   Cardio:    no chest pain  Respiratory:    no cough   no shortness of breath  Endocrine:   GI:    no abdominal pain   no diarrhea   no  nausea   no vomiting  :   Musculoskeletal:   Hematologic:    no history of blood transfusion  Skin:   no sores/wound   no rash      DASI Risk Score    No data to display       Caprini DVT Assessment    No data to display       Modified Frailty Index    No data to display       [id number] Stroke Risk Points  Current as of just now        N/A 0 to 9 Points:      Last Change: N/A          The [id number] risk score (Lip GUS, et al. 2009. © 2010 American College [employer]) quantifies the risk of stroke for a patient with atrial fibrillation. For patients without atrial fibrillation or under the age of 18 this score appears as N/A. Higher score values generally indicate higher risk of stroke.        This score is not applicable to this patient. Components are not calculated.          Revised Cardiac Risk Index    No data to display       Apfel Simplified Score    No data to display       Risk Analysis Index Results This Encounter    No data found in the last 10 encounters.       Stop Bang Score      Flowsheet Row Clinical Support from 3/13/2025 in [facility]   Do you snore loudly? 0 filed at 03/13/2025 0817   Do you often feel tired or fatigued after your sleep? 1 filed at 03/13/2025 0817   Has anyone ever observed you stop breathing in your sleep? 0 filed at 03/13/2025 0817   Do you have or are you being treated for high blood pressure? 0 filed at 03/13/2025 0817   Recent BMI (Calculated) 31.2 filed at 03/13/2025 0817   Is BMI greater than 35 kg/m2? 0=No filed at 03/13/2025 0817   Age older than 50 years old? 0=No filed at 03/13/2025 0817   Is your neck circumference greater than 17 inches (Male) or 16 inches (Female)? 0 filed at 03/13/2025 0817   Gender - Male 0=No filed at 03/13/2025 0817   STOP-BANG Total Score 1 filed at 03/13/2025 0817          Prodigy: High Risk  Total Score: 0          ARISCAT Score for Postoperative Pulmonary Complications    No data to display       [place]  Perioperative Risk for Myocardial Infarction or Cardiac Arrest (MARGARITO)    No data to display         Nurse Plan of Action:

## 2025-03-13 NOTE — PREPROCEDURE INSTRUCTIONS
Current Medications   Medication Instructions    cyanocobalamin (Vitamin B-12) 1,000 mcg tablet Hold 7 days before surgery    naproxen (Naprosyn) 500 mg tablet Hold 7 days before surgery    tirzepatide (Mounjaro) 15 mg/0.5 mL pen injector Hold 7 days before surgery                       NPO Instructions:    Do not eat any food after midnight the night before your surgery/procedure.    Additional Instructions:     Seven/Six Days before Surgery:  Review your medication instructions, stop indicated medications  Five Days before Surgery:  Review your medication instructions, stop indicated medications  Three Days before Surgery:  Review your medication instructions, stop indicated medications  The Day before Surgery:  Review your medication instructions, stop indicated medications  You will be contacted regarding the time of your arrival to facility and surgery time  Do not eat any food after Midnight  Day of Surgery:  Review your medication instructions, take indicated medications  Wear  comfortable loose fitting clothing  Do not use moisturizers, creams, lotions or perfume  All jewelry and valuables should be left at home

## 2025-03-14 ENCOUNTER — TELEPHONE (OUTPATIENT)
Dept: OBSTETRICS AND GYNECOLOGY | Facility: CLINIC | Age: 35
End: 2025-03-14
Payer: MEDICAID

## 2025-03-14 DIAGNOSIS — R35.0 URINARY FREQUENCY: ICD-10-CM

## 2025-03-14 RX ORDER — NITROFURANTOIN 25; 75 MG/1; MG/1
100 CAPSULE ORAL 2 TIMES DAILY
Qty: 10 CAPSULE | Refills: 0 | Status: SHIPPED | OUTPATIENT
Start: 2025-03-14 | End: 2025-03-19

## 2025-03-14 NOTE — TELEPHONE ENCOUNTER
Spoke to patient- C/O pelvic cramping , malodorous urine, urinary frequency x 3 days   Patient agreeable to providing urine sample to out patient lab for a culture- order placed  [facility]

## 2025-03-14 NOTE — TELEPHONE ENCOUNTER
Patient states she is experiencing UTI symptoms, has cramping, strong odor, and discomfort, please advise?

## 2025-03-16 LAB — BACTERIA UR CULT: ABNORMAL

## 2025-03-17 ENCOUNTER — TELEPHONE (OUTPATIENT)
Dept: OBSTETRICS AND GYNECOLOGY | Facility: CLINIC | Age: 35
End: 2025-03-17
Payer: MEDICAID

## 2025-03-17 NOTE — TELEPHONE ENCOUNTER
----- Message from Gabbi Knowles sent at 3/17/2025 12:38 PM EDT -----  Macrobid ordered 3/14.  Please inform patient that her culture returned showing the UTI will be treated by the antibiotic called in by Ave on Friday.

## 2025-04-17 NOTE — DISCHARGE INSTRUCTIONS
Adis Ledbetter M.D.   Sports Medicine Orthopaedic Surgery    [facility]  [address] [address] [facility]                  [address] [facility]       [address]  [facility], [address]   Phone: [phone]         Phone: [phone]       Phone: [phone]   Fax: [phone]                         Fax: [phone]       Fax: [phone]     After hours phone number: [phone]    AFTER SURGERY     Anesthesia  If you received a nerve block during surgery, you may have numbness or inability to move the limb. Do not be alarmed as this may last 8-36 hours depending upon the amount and type of medication used by the anesthesiologist. Make sure If you are experiencing numbness after 36 hours, please call the office. When the nerve block begins to wear off, you will feel a tingling sensation, like pins and needles. It is important that you start taking the pain medication at that time to ensure that you “stay ahead of the pain.” It is important to take the pain medicine when the pain level is a 4 or 5/10, before it gets too high.    Do not operate heavy machinery, make major decisions, drink alcohol or smoke for 24 hours. Do not drink alcohol while taking pain medications.    Prescribed Medications   Narcotic pain medicine (Oxycodone): The goal of post-operative pain management is pain control, NOT pain elimination. You should expect some pain after surgery - this pain helps you protect itself while it is healing. Constipation, nausea, itching, and drowsiness are side effects of this type of medication. You should take an over-the-counter stool softener (Colace and/or Senna) while taking narcotics to prevent constipation. If you experience itching, over the counter Benadryl may be helpful. Narcotic pain medications often produce drowsiness and it is against the law to operate a vehicle  while taking these medications. If you are taking oxycodone, you should take acetaminophen (Tylenol) around the clock to decrease baseline pain. Do not take Tylenol-containing products while on Percocet or Lagrange.   Refill Policy: For concerns over your safety due to the rising opioid addiction epidemic in the [place], refills of your narcotic pain medications will only be provided on a case by case basis. Please use these medications judiciously.    Anti-inflammatory (NSAID) medicine (Naproxen or Mobic): These are both anti-inflammatory and pain relief. Do NOT take this medication if you have had an ulcer in the past unless you have cleared this with your primary care doctor. You should take NSAIDs with food or antacid to reduce the chance of upset stomach. Depending on your surgery, Dr. Ledbetter may instruct you to avoid these medications.    Anti-nausea medicine (ondansetron/Zofran): sometimes patients experience nausea related to either anesthesia or the narcotic pain medication. If this is the case you will find this medication helpful.    DVT prophylaxis (Aspirin or Eliquis): For most patients, activity alone is sufficient to prevent dangerous blood clots, but in some cases your personal risk profile and/or the type of surgery you have undergone makes it necessary that you take medication to help prevent blood clots. Dr. Ledbetter will inform you if you are to start one of these medications postoperatively.    Stool softener (Colace and/or Senna): are available over the counter at your local pharmacy and should be taken while you are taking narcotic pain medication to avoid constipation. You should stop taking these medications if you develop diarrhea. Over the counter laxatives may be used if you develop painful constipation.     Diet   Start with clear liquids (water, juice, Gatorade) and light foods (jello, soup, crackers). Progress to normal diet as tolerated if you are not nauseated. Avoid greasy or spicy  foods for the first 24hrs to avoid GI upset. Increase fluid intake to help prevent constipation.    Dressings / Wound Care  You may remove the outer dressing after 3 days and then can shower. (If you have a splint, please leave the splint in place until follow-up.) Do not remove Steri-strips (white stickers) if present over your incisions. Steri-strips may fall off on their own, which is normal. After the bandage has been removed, you may leave the incisions open to air. Alternatively, if you prefer to keep them covered, you may do so with Band-Aids, a light gauze dressing, or a clean ACE wrap. You may shower after the bandage has been removed (3 days), but it is very important that you pat the wounds dry after the shower. It is OK to allow soap and water to run over the wound - DO NOT soak or scrub the wound. Outside of the shower, keep your incision clean and dry until your first postoperative visit, approximately 10-14 days after surgery. You may remove your sling or brace to shower, unless otherwise instructed. As your balance may be affected by recent surgery, we recommend placing a plastic chair in the shower to help prevent falls. Do NOT take baths, go into a pool, or soak the operative site until approved by Dr. Ledbetter.    Bracing / Physical Therapy   If you were given one, make sure you wear sling or brace at all times until your follow-up with Dr. Ledbetter. Only remove your sling or brace for physical therapy, home exercises, and hygiene. These are typically used for 4-6 weeks after surgery in order to protect the healing of the tissue.     Physical therapy is just as important to your recovery as the actual operation! If you were given a prescription for physical therapy, make sure you go to your appointments and do your exercises daily at home (especially motion exercises).     Ice is a very important part of your recovery. It helps reduce inflammation and improves pain control. You should ice a few times  each day for 20-30 minutes at a time. Please make sure there is something under the ice (clean towel, cloth, T-shirt) so that the ice doesn't directly contact your skin. If you ordered a commercially available ice machine (optional) and a compression setting is available, you should use LOW or no compression during the first 5 days. After that, you may increase compression setting as tolerated. If the compression is bothering you then do not use compression.    Driving / Travel  Ultimately, it is your judgment to decide when you are safe to drive, but if you are at all unsure, do not risk your life or someone else's. As a general guideline, you will not be able to drive for 4-6 weeks after surgery. You should certainly not drive while on narcotics pain medication or while in a brace or sling.     Avoid flights and long distance traveling for 6 weeks after surgery. It is important to discuss your travel plans with Dr. Ledbetter, as additional medications may need to be prescribed to help prevent blood clots if certain travel is unavoidable.     Return to Work or School   Your return to work will depend on what surgery was done and what type of work you do. Please note that these are general guidelines, and there may be modifications based on your unique situation. Typically, you may return to sedentary work or school 3-7 days after surgery if pain is tolerable and you are no longer requiring narcotic pain medication. In conjunction with your input, Dr. Ledbetter will determine when you may return to more physically rigorous demands.     If you had Knee or Hip Surgery   If your surgery involves a ligament reconstruction or tendon repair, you will typically be prescribed crutches for at least the first few days until pain and the postoperative protocol allows you to fully bear weight and also wear a brace for 4-6 weeks. If cartilage surgery or meniscus repair is performed, you may be on crutches for 6 weeks. Individual  rehabilitation guidelines will vary based upon the unique situation and surgery of every patient, but take these general guidelines into account when planning return to work.     If you had Shoulder Surgery   If your surgery involves a repair (rotator cuff repair, labral repair), you will have a sling on for six weeks after surgery. If you have a sling, you will need to wear it all day. Please wear the sling at all times except for bathing for the first 2 weeks minimum. As long as you can abide by the restrictions, you can return to work when you feel like you can do so safely. However, you will need to take into consideration driving and activities related to your job. You may be able to safely loosen it if you are able to keep your arm supported. Please understand that you will NOT be able to work with your arm away from your body, above shoulder level, or use your arm against gravity for approximately 8 weeks. For jobs that require physical labor, you may require four months or more to return to work. If your surgery does NOT involve a repair (subacromial decompression, distal clavicle excision, capsular release), then you will be in a sling for only a few days after surgery. When comfortable, you may return to work when ready to conduct normal activities of your job. Remember that you may be on narcotic pain medications and these should be discontinued prior to your return to work. For jobs that require physical labor, you may require 6 weeks or more to return to work.     If you had Elbow or Wrist Surgery   If your surgery involves a repair or reconstruction, you will have a splint and sling on followed by a brace for four to six weeks after surgery. As long as you can abide by the restrictions, you can return to work when you feel like you can do so safely. However, you will need to take into consideration driving and activities related to your job. If you have a sling, you will need to wear it all day unless  otherwise instructed. You may be able to safely loosen it if you are able to keep your arm supported. For jobs that require physical labor, you may require four months or more to return to work.    If you had Ankle Surgery   If your surgery involves a repair or reconstruction, you will have a splint followed by a walking boot for four to six weeks after surgery. As long as you can abide by the restrictions, you can return to work when you feel like you can do so safely. However, you will need to take into consideration driving and activities related to your job. For jobs that require physical labor, you may require four months or more to return to work.    Normal Sensations and Findings after Surgery:   PAIN: We do everything possible to make your pain/discomfort level tolerable, but some amount of pain is to be expected.   WARMTH: Mild warmth around the operative site is normal for up to 3 weeks.   REDNESS: Small amount of redness where the sutures enter the skin is normal. If redness worsens or spreads it is important that you contact the office.   DRAINAGE: A small amount is normal for the first 48-72 hours. If wounds continue to drain after this time (requiring multiple gauze changes per day), please contact the office.   NUMBNESS: Around the incision is common.   BRUISING: Is common and often tracks down the arm or leg due to gravity and results in an alarming appearance, but is common and will resolve with time.   FEVER: Low-grade fevers (less than 101.5°F) are common during the first week after surgery. You should drink plenty of fluids and breathe deeply.   CONSTIPATION: Post-operative pain medications as well as immobility can lead to constipation. Please consider taking an over the counter stool softener such as Colace and/or Senna if you experience constipation or if you have a history of constipation.    Follow-Up   A Follow-up appointment should be arranged for 10-14 days after surgery. If one has not  been provided, please call the office to schedule.       NOTIFY US IMMEDIATELY FOR ANY OF THE FOLLOWING:   Most orthopedic surgical procedures are uneventful. However, complications can occur. The following are things to be aware of in the immediate postoperative period.   FEVER - Temperature rises above 101.5°F or associated chills/sweats   WOUND - If you notice drainage more than 4 days after surgery, if the drainage turns yellows and foul smelling, if you need to change gauze multiple times per day, or if sutures become loose.   CARDIOVASCULAR - Chest pain, shortness of breath, palpitations, or fainting spells must be taken seriously. Go to the emergency room (or call 911) immediately for evaluation.   BLOOD CLOTS - Orthopaedic surgery patients are at risk for blood clots. While the risk is higher for lower extremity surgery, even those who have undergone upper extremity surgery are at an increased risk. Please notify Dr. Ledbetter if you or someone in your family has had blood clots or any type of known clotting disorder. Signs of blood clots may include calf pain or cramping, diffuse swelling in the leg and foot, or chest pain and shortness of breath. Please call the office or go to the hospital if you recognize any of these symptoms.   NAUSEA - If you have severe vomiting, diarrhea, or constipation, or cannot keep any liquid down   URINARY RETENTION - If you cannot urinate the night after surgery, please go to the Emergency Room.

## 2025-04-18 ENCOUNTER — ANESTHESIA (OUTPATIENT)
Dept: OPERATING ROOM | Facility: HOSPITAL | Age: 35
End: 2025-04-18
Payer: MEDICAID

## 2025-04-18 ENCOUNTER — HOSPITAL ENCOUNTER (OUTPATIENT)
Facility: HOSPITAL | Age: 35
Setting detail: OUTPATIENT SURGERY
Discharge: HOME | End: 2025-04-18
Attending: STUDENT IN AN ORGANIZED HEALTH CARE EDUCATION/TRAINING PROGRAM | Admitting: STUDENT IN AN ORGANIZED HEALTH CARE EDUCATION/TRAINING PROGRAM
Payer: MEDICAID

## 2025-04-18 ENCOUNTER — ANESTHESIA EVENT (OUTPATIENT)
Dept: OPERATING ROOM | Facility: HOSPITAL | Age: 35
End: 2025-04-18
Payer: MEDICAID

## 2025-04-18 VITALS
BODY MASS INDEX: 30.5 KG/M2 | DIASTOLIC BLOOD PRESSURE: 71 MMHG | WEIGHT: 205.91 LBS | OXYGEN SATURATION: 100 % | SYSTOLIC BLOOD PRESSURE: 117 MMHG | HEART RATE: 67 BPM | RESPIRATION RATE: 16 BRPM | HEIGHT: 69 IN | TEMPERATURE: 98.1 F

## 2025-04-18 DIAGNOSIS — S42.291A HILL SACHS DEFORMITY, RIGHT: Primary | ICD-10-CM

## 2025-04-18 DIAGNOSIS — M25.311 INSTABILITY OF RIGHT SHOULDER JOINT: ICD-10-CM

## 2025-04-18 DIAGNOSIS — M65.911 SYNOVITIS OF RIGHT SHOULDER: ICD-10-CM

## 2025-04-18 DIAGNOSIS — S43.431A GLENOID LABRUM TEAR, RIGHT, INITIAL ENCOUNTER: ICD-10-CM

## 2025-04-18 LAB — HCG UR QL IA.RAPID: NEGATIVE

## 2025-04-18 PROCEDURE — 7100000001 HC RECOVERY ROOM TIME - INITIAL BASE CHARGE: Performed by: STUDENT IN AN ORGANIZED HEALTH CARE EDUCATION/TRAINING PROGRAM

## 2025-04-18 PROCEDURE — 2500000004 HC RX 250 GENERAL PHARMACY W/ HCPCS (ALT 636 FOR OP/ED)

## 2025-04-18 PROCEDURE — 3700000001 HC GENERAL ANESTHESIA TIME - INITIAL BASE CHARGE: Performed by: STUDENT IN AN ORGANIZED HEALTH CARE EDUCATION/TRAINING PROGRAM

## 2025-04-18 PROCEDURE — 2500000001 HC RX 250 WO HCPCS SELF ADMINISTERED DRUGS (ALT 637 FOR MEDICARE OP)

## 2025-04-18 PROCEDURE — 7100000009 HC PHASE TWO TIME - INITIAL BASE CHARGE: Performed by: STUDENT IN AN ORGANIZED HEALTH CARE EDUCATION/TRAINING PROGRAM

## 2025-04-18 PROCEDURE — 29806 SHO ARTHRS SRG CAPSULORRAPHY: CPT | Performed by: STUDENT IN AN ORGANIZED HEALTH CARE EDUCATION/TRAINING PROGRAM

## 2025-04-18 PROCEDURE — 2500000004 HC RX 250 GENERAL PHARMACY W/ HCPCS (ALT 636 FOR OP/ED): Mod: JZ

## 2025-04-18 PROCEDURE — 7100000010 HC PHASE TWO TIME - EACH INCREMENTAL 1 MINUTE: Performed by: STUDENT IN AN ORGANIZED HEALTH CARE EDUCATION/TRAINING PROGRAM

## 2025-04-18 PROCEDURE — A4649 SURGICAL SUPPLIES: HCPCS | Performed by: STUDENT IN AN ORGANIZED HEALTH CARE EDUCATION/TRAINING PROGRAM

## 2025-04-18 PROCEDURE — 29827 SHO ARTHRS SRG RT8TR CUF RPR: CPT | Performed by: STUDENT IN AN ORGANIZED HEALTH CARE EDUCATION/TRAINING PROGRAM

## 2025-04-18 PROCEDURE — 2500000005 HC RX 250 GENERAL PHARMACY W/O HCPCS

## 2025-04-18 PROCEDURE — 3700000002 HC GENERAL ANESTHESIA TIME - EACH INCREMENTAL 1 MINUTE: Performed by: STUDENT IN AN ORGANIZED HEALTH CARE EDUCATION/TRAINING PROGRAM

## 2025-04-18 PROCEDURE — 7100000002 HC RECOVERY ROOM TIME - EACH INCREMENTAL 1 MINUTE: Performed by: STUDENT IN AN ORGANIZED HEALTH CARE EDUCATION/TRAINING PROGRAM

## 2025-04-18 PROCEDURE — 3600000004 HC OR TIME - INITIAL BASE CHARGE - PROCEDURE LEVEL FOUR: Performed by: STUDENT IN AN ORGANIZED HEALTH CARE EDUCATION/TRAINING PROGRAM

## 2025-04-18 PROCEDURE — 29827 SHO ARTHRS SRG RT8TR CUF RPR: CPT

## 2025-04-18 PROCEDURE — 29823 SHO ARTHRS SRG XTNSV DBRDMT: CPT

## 2025-04-18 PROCEDURE — 2500000004 HC RX 250 GENERAL PHARMACY W/ HCPCS (ALT 636 FOR OP/ED): Mod: JZ | Performed by: ANESTHESIOLOGY

## 2025-04-18 PROCEDURE — 81025 URINE PREGNANCY TEST: CPT

## 2025-04-18 PROCEDURE — 3600000009 HC OR TIME - EACH INCREMENTAL 1 MINUTE - PROCEDURE LEVEL FOUR: Performed by: STUDENT IN AN ORGANIZED HEALTH CARE EDUCATION/TRAINING PROGRAM

## 2025-04-18 PROCEDURE — 2780000003 HC OR 278 NO HCPCS: Performed by: STUDENT IN AN ORGANIZED HEALTH CARE EDUCATION/TRAINING PROGRAM

## 2025-04-18 PROCEDURE — 2720000007 HC OR 272 NO HCPCS: Performed by: STUDENT IN AN ORGANIZED HEALTH CARE EDUCATION/TRAINING PROGRAM

## 2025-04-18 PROCEDURE — 29823 SHO ARTHRS SRG XTNSV DBRDMT: CPT | Performed by: STUDENT IN AN ORGANIZED HEALTH CARE EDUCATION/TRAINING PROGRAM

## 2025-04-18 DEVICE — IMPLANTABLE DEVICE: Type: IMPLANTABLE DEVICE | Site: SHOULDER | Status: FUNCTIONAL

## 2025-04-18 RX ORDER — MIDAZOLAM HYDROCHLORIDE 1 MG/ML
INJECTION, SOLUTION INTRAMUSCULAR; INTRAVENOUS AS NEEDED
Status: DISCONTINUED | OUTPATIENT
Start: 2025-04-18 | End: 2025-04-18

## 2025-04-18 RX ORDER — CELECOXIB 200 MG/1
200 CAPSULE ORAL ONCE
Status: COMPLETED | OUTPATIENT
Start: 2025-04-18 | End: 2025-04-18

## 2025-04-18 RX ORDER — ONDANSETRON HYDROCHLORIDE 2 MG/ML
INJECTION, SOLUTION INTRAVENOUS AS NEEDED
Status: DISCONTINUED | OUTPATIENT
Start: 2025-04-18 | End: 2025-04-18

## 2025-04-18 RX ORDER — OXYCODONE HYDROCHLORIDE 5 MG/1
5 TABLET ORAL EVERY 4 HOURS PRN
Qty: 15 TABLET | Refills: 0 | Status: SHIPPED | OUTPATIENT
Start: 2025-04-18 | End: 2025-04-21

## 2025-04-18 RX ORDER — LIDOCAINE HYDROCHLORIDE 10 MG/ML
0.1 INJECTION, SOLUTION EPIDURAL; INFILTRATION; INTRACAUDAL; PERINEURAL ONCE
Status: DISCONTINUED | OUTPATIENT
Start: 2025-04-18 | End: 2025-04-18 | Stop reason: HOSPADM

## 2025-04-18 RX ORDER — CEFAZOLIN SODIUM 2 G/100ML
2 INJECTION, SOLUTION INTRAVENOUS ONCE
Status: COMPLETED | OUTPATIENT
Start: 2025-04-18 | End: 2025-04-18

## 2025-04-18 RX ORDER — FENTANYL CITRATE 50 UG/ML
25 INJECTION, SOLUTION INTRAMUSCULAR; INTRAVENOUS EVERY 5 MIN PRN
Status: DISCONTINUED | OUTPATIENT
Start: 2025-04-18 | End: 2025-04-18 | Stop reason: HOSPADM

## 2025-04-18 RX ORDER — GABAPENTIN 300 MG/1
300 CAPSULE ORAL ONCE
Status: COMPLETED | OUTPATIENT
Start: 2025-04-18 | End: 2025-04-18

## 2025-04-18 RX ORDER — KETOROLAC TROMETHAMINE 30 MG/ML
INJECTION, SOLUTION INTRAMUSCULAR; INTRAVENOUS AS NEEDED
Status: DISCONTINUED | OUTPATIENT
Start: 2025-04-18 | End: 2025-04-18

## 2025-04-18 RX ORDER — ROCURONIUM BROMIDE 10 MG/ML
INJECTION, SOLUTION INTRAVENOUS AS NEEDED
Status: DISCONTINUED | OUTPATIENT
Start: 2025-04-18 | End: 2025-04-18

## 2025-04-18 RX ORDER — ONDANSETRON 4 MG/1
4 TABLET, FILM COATED ORAL EVERY 8 HOURS PRN
Qty: 20 TABLET | Refills: 0 | Status: SHIPPED | OUTPATIENT
Start: 2025-04-18 | End: 2025-04-25

## 2025-04-18 RX ORDER — NORETHINDRONE AND ETHINYL ESTRADIOL 0.5-0.035
50 KIT ORAL ONCE AS NEEDED
Status: DISCONTINUED | OUTPATIENT
Start: 2025-04-18 | End: 2025-04-18 | Stop reason: HOSPADM

## 2025-04-18 RX ORDER — PROPOFOL 10 MG/ML
INJECTION, EMULSION INTRAVENOUS AS NEEDED
Status: DISCONTINUED | OUTPATIENT
Start: 2025-04-18 | End: 2025-04-18

## 2025-04-18 RX ORDER — ASPIRIN 81 MG/1
81 TABLET ORAL 2 TIMES DAILY
Qty: 30 TABLET | Refills: 0 | Status: SHIPPED | OUTPATIENT
Start: 2025-04-18 | End: 2025-05-03

## 2025-04-18 RX ORDER — LIDOCAINE HYDROCHLORIDE 20 MG/ML
INJECTION, SOLUTION INFILTRATION; PERINEURAL AS NEEDED
Status: DISCONTINUED | OUTPATIENT
Start: 2025-04-18 | End: 2025-04-18

## 2025-04-18 RX ORDER — FENTANYL CITRATE 50 UG/ML
INJECTION, SOLUTION INTRAMUSCULAR; INTRAVENOUS AS NEEDED
Status: DISCONTINUED | OUTPATIENT
Start: 2025-04-18 | End: 2025-04-18

## 2025-04-18 RX ORDER — ACETAMINOPHEN 325 MG/1
975 TABLET ORAL ONCE
Status: COMPLETED | OUTPATIENT
Start: 2025-04-18 | End: 2025-04-18

## 2025-04-18 RX ORDER — FENTANYL CITRATE 50 UG/ML
50 INJECTION, SOLUTION INTRAMUSCULAR; INTRAVENOUS EVERY 5 MIN PRN
Status: DISCONTINUED | OUTPATIENT
Start: 2025-04-18 | End: 2025-04-18 | Stop reason: HOSPADM

## 2025-04-18 RX ORDER — SCOPOLAMINE 1 MG/3D
1 PATCH, EXTENDED RELEASE TRANSDERMAL
Status: DISCONTINUED | OUTPATIENT
Start: 2025-04-18 | End: 2025-04-18 | Stop reason: HOSPADM

## 2025-04-18 RX ORDER — FENTANYL CITRATE 50 UG/ML
50 INJECTION, SOLUTION INTRAMUSCULAR; INTRAVENOUS ONCE
Status: COMPLETED | OUTPATIENT
Start: 2025-04-18 | End: 2025-04-18

## 2025-04-18 RX ORDER — ACETAMINOPHEN 500 MG
1000 TABLET ORAL EVERY 8 HOURS PRN
Qty: 60 TABLET | Refills: 1 | Status: SHIPPED | OUTPATIENT
Start: 2025-04-18 | End: 2025-05-08

## 2025-04-18 RX ORDER — MIDAZOLAM HYDROCHLORIDE 1 MG/ML
2 INJECTION, SOLUTION INTRAMUSCULAR; INTRAVENOUS ONCE
Status: COMPLETED | OUTPATIENT
Start: 2025-04-18 | End: 2025-04-18

## 2025-04-18 RX ORDER — SODIUM CHLORIDE, SODIUM LACTATE, POTASSIUM CHLORIDE, CALCIUM CHLORIDE 600; 310; 30; 20 MG/100ML; MG/100ML; MG/100ML; MG/100ML
100 INJECTION, SOLUTION INTRAVENOUS CONTINUOUS
Status: DISCONTINUED | OUTPATIENT
Start: 2025-04-18 | End: 2025-04-18 | Stop reason: HOSPADM

## 2025-04-18 RX ADMIN — FENTANYL CITRATE 50 MCG: 50 INJECTION, SOLUTION INTRAMUSCULAR; INTRAVENOUS at 11:36

## 2025-04-18 RX ADMIN — CELECOXIB 200 MG: 200 CAPSULE ORAL at 09:54

## 2025-04-18 RX ADMIN — POVIDONE-IODINE 1 APPLICATION: 5 SOLUTION TOPICAL at 09:51

## 2025-04-18 RX ADMIN — SCOPOLAMINE 1 PATCH: 1.5 PATCH, EXTENDED RELEASE TRANSDERMAL at 10:08

## 2025-04-18 RX ADMIN — ONDANSETRON 4 MG: 2 INJECTION, SOLUTION INTRAMUSCULAR; INTRAVENOUS at 12:21

## 2025-04-18 RX ADMIN — FENTANYL CITRATE 50 MCG: 50 INJECTION, SOLUTION INTRAMUSCULAR; INTRAVENOUS at 11:23

## 2025-04-18 RX ADMIN — GABAPENTIN 300 MG: 300 CAPSULE ORAL at 09:54

## 2025-04-18 RX ADMIN — DEXAMETHASONE SODIUM PHOSPHATE 4 MG: 4 INJECTION INTRA-ARTICULAR; INTRALESIONAL; INTRAMUSCULAR; INTRAVENOUS; SOFT TISSUE at 11:23

## 2025-04-18 RX ADMIN — MIDAZOLAM 2 MG: 1 INJECTION INTRAMUSCULAR; INTRAVENOUS at 10:21

## 2025-04-18 RX ADMIN — PROPOFOL 200 MG: 10 INJECTION, EMULSION INTRAVENOUS at 11:15

## 2025-04-18 RX ADMIN — FENTANYL CITRATE 50 MCG: 50 INJECTION INTRAMUSCULAR; INTRAVENOUS at 10:21

## 2025-04-18 RX ADMIN — LIDOCAINE HYDROCHLORIDE 100 MG: 20 INJECTION, SOLUTION INFILTRATION; PERINEURAL at 11:16

## 2025-04-18 RX ADMIN — KETOROLAC TROMETHAMINE 30 MG: 30 INJECTION, SOLUTION INTRAMUSCULAR; INTRAVENOUS at 12:21

## 2025-04-18 RX ADMIN — SODIUM CHLORIDE, POTASSIUM CHLORIDE, SODIUM LACTATE AND CALCIUM CHLORIDE: 600; 310; 30; 20 INJECTION, SOLUTION INTRAVENOUS at 11:10

## 2025-04-18 RX ADMIN — MIDAZOLAM 2 MG: 1 INJECTION INTRAMUSCULAR; INTRAVENOUS at 11:03

## 2025-04-18 RX ADMIN — ROCURONIUM BROMIDE 50 MG: 50 INJECTION INTRAVENOUS at 11:16

## 2025-04-18 RX ADMIN — ACETAMINOPHEN 975 MG: 325 TABLET ORAL at 09:54

## 2025-04-18 RX ADMIN — CEFAZOLIN SODIUM 2 G: 2 INJECTION, SOLUTION INTRAVENOUS at 11:26

## 2025-04-18 SDOH — HEALTH STABILITY: MENTAL HEALTH: CURRENT SMOKER: 0

## 2025-04-18 ASSESSMENT — PAIN - FUNCTIONAL ASSESSMENT
PAIN_FUNCTIONAL_ASSESSMENT: 0-10
PAIN_FUNCTIONAL_ASSESSMENT: WONG-BAKER FACES

## 2025-04-18 ASSESSMENT — PAIN SCALES - GENERAL
PAINLEVEL_OUTOF10: 0 - NO PAIN
PAIN_LEVEL: 3

## 2025-04-18 ASSESSMENT — ENCOUNTER SYMPTOMS
ENDOCRINE NEGATIVE: 1
GASTROINTESTINAL NEGATIVE: 1
HEMATOLOGIC/LYMPHATIC NEGATIVE: 1
NEUROLOGICAL NEGATIVE: 1
CARDIOVASCULAR NEGATIVE: 1
PSYCHIATRIC NEGATIVE: 1
CONSTITUTIONAL NEGATIVE: 1
RESPIRATORY NEGATIVE: 1
ALLERGIC/IMMUNOLOGIC NEGATIVE: 1
ARTHRALGIAS: 1
EYES NEGATIVE: 1

## 2025-04-18 ASSESSMENT — COLUMBIA-SUICIDE SEVERITY RATING SCALE - C-SSRS
6. HAVE YOU EVER DONE ANYTHING, STARTED TO DO ANYTHING, OR PREPARED TO DO ANYTHING TO END YOUR LIFE?: NO
2. HAVE YOU ACTUALLY HAD ANY THOUGHTS OF KILLING YOURSELF?: NO
1. IN THE PAST MONTH, HAVE YOU WISHED YOU WERE DEAD OR WISHED YOU COULD GO TO SLEEP AND NOT WAKE UP?: NO

## 2025-04-18 NOTE — ANESTHESIA PROCEDURE NOTES
Airway  Date/Time: 4/18/2025 11:26 AM  Reason: elective    Airway not difficult    Staffing  Performed: SYLVIA   Authorized by: Ector Eddy MD    Performed by: SYLVIA Tesfaye  Patient location during procedure: OR    Patient Condition  Indications for airway management: anesthesia  Patient position: sniffing  MILS maintained throughout  Sedation level: deep     Final Airway Details  Preoxygenated: no  Final airway type: endotracheal airway  Successful airway: ETT  Cuffed: yes   Successful intubation technique: direct laryngoscopy  Adjuncts used in placement: intubating stylet  Endotracheal tube insertion site: oral  Blade: Gretel  Blade size: #3  ETT size (mm): 7.0  Cormack-Lehane Classification: grade I - full view of glottis  Placement verified by: chest auscultation and capnometry   Measured from: gums  ETT to gums (cm): 22  Number of attempts at approach: 1    Additional Comments  Atraumatic intubation, dentition in tact

## 2025-04-18 NOTE — BRIEF OP NOTE
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  Right shoulder arthroscopy, anterior stabilization, labral repair and capsulorrhaphy  2.  Right shoulder arthroscopic remplissage  3.  Right shoulder arthroscopic extensive intra-articular debridement and synovectomy    Surgeons      * Adis Ledbetter - Primary    Resident/Fellow/Other Assistant:  Surgeons and Role:     * Gabrielle Lopresti, PA-C - [facility] First Assist    Staff:   Circulator: Geenvieve  Scrub Person: Yolanda  Scrub Person: Kinza  Scrub Person: Mykel Ren Circulator: Aubree    Anesthesia Staff: Anesthesiologist: Ector Eddy MD  C-AA: SYLVIA Tesfaye  Frontline Breaker: SYLVIA Sarmiento    Procedure Summary  Anesthesia: Anesthesia type not filed in the log.  ASA: I  Estimated Blood Loss: 5mL  Intra-op Medications:   Administrations occurring from 1030 to 1300 on 25:   Medication Name Total Dose   dexAMETHasone (Decadron) injection 4 mg/mL 4 mg   fentaNYL (Sublimaze) injection 50 mcg/mL 100 mcg   ketorolac (Toradol) 15 mg 30 mg   LR bolus Cannot be calculated   lidocaine (Xylocaine) injection 2 % 100 mg   ondansetron 2 mg/mL 4 mg   propofol (Diprivan) injection 10 mg/mL 200 mg   rocuronium (ZeMuron) 50 mg/5 mL injection 50 mg   ceFAZolin (Ancef) 2 g in dextrose (iso)  mL 2 g          Anesthesia Record               Intraprocedure I/O Totals       None           Specimen: No specimens collected      Findings: Anterior-inferior glenoid labral tear, posterior humeral head Hill-Sachs defect, synovitis    Complications:  None; patient tolerated the procedure well.     Disposition: PACU - hemodynamically stable.  Condition: stable  Specimens Collected: No specimens collected  Attending Attestation: I performed the procedure.    Adis Ledbetter  Phone Number: [phone]

## 2025-04-18 NOTE — H&P
History Of Present Illness  Ruthann Nichole is a 35 y.o. female presenting with Recurrent right shoulder anterior instability and a glenoid labral tear.  She failed nonoperative management.  MRI confirmed the above.  After long discussion with the patient, she elected to proceed with surgical invention form of a right shoulder arthroscopy, anterior stabilization, labral repair, capsulorrhaphy, remplissage, extensive intra-articular debridement and synovectomy.  I thoroughly explained the risks and benefits as well as the expected postoperative timeline for the proposed procedure versus nonoperative management. Risks of this procedure include but are not limited to bleeding, infection, nerve injury, DVT/PE and failure of repair or implant.  The patient expressed understanding and wished to proceed with surgical intervention.  All questions were answered. They were consented to the above procedure at bedside.     Past Medical History  She has no past medical history on file.      Surgical History  She has a past surgical history that includes Other surgical history (11/05/2019) and Other surgical history (06/02/2020).     Social History  She reports that she has never smoked. She has been exposed to tobacco smoke. She has never used smokeless tobacco. She reports that she does not drink alcohol and does not use drugs.    Family History  Family History[1]     Allergies  Patient has no known allergies.    Review of Systems   Constitutional: Negative.    HENT: Negative.     Eyes: Negative.    Respiratory: Negative.     Cardiovascular: Negative.    Gastrointestinal: Negative.    Endocrine: Negative.    Genitourinary: Negative.    Musculoskeletal:  Positive for arthralgias.   Skin: Negative.    Allergic/Immunologic: Negative.    Neurological: Negative.    Hematological: Negative.    Psychiatric/Behavioral: Negative.          Physical Exam  GENERAL: A/Ox3, NAD. Appears healthy, well nourished  PSYCHIATRIC: Mood stable,  "appropriate memory recall  EYES: EOM intact, no scleral icterus  CARDIOVASCULAR: Palpable peripheral pulses  LUNGS: Breathing non-labored on room air  SKIN: no erythema, rashes, or ecchymosis      MUSCULOSKELETAL:  Laterality: right Shoulder Exam  - Negative Spurlings, full painless neck ROM  - Skin intact  - No erythema or warmth  - No ecchymosis or soft tissue swelling  - Shoulder ROM: Forward flexion 130 with apprehension, ER 45 with apprehension, IR upper lumbar  - Strength:      Jobes 5-/5     ER 5-/5     Belly press and bearhug 5-/5  - Palpation: Positive tenderness of the anterior and posterior joint lines  - Peacock and Neers equivocal  - Speeds and O'Briens positive  - Stability: Anterior/Posterior load and shift 1+ anterior with significant guarding, stable posterior  - Special Tests: Positive apprehension relocation, equivocal Samira     NEUROVASCULAR:  - Neurovascular Status: sensation intact to light touch distally, upper extremity motor grossly intact  - Capillary refill brisk at extremities, Bilateral peripheral pulses 2+        Last Recorded Vitals  Blood pressure 123/66, pulse 68, temperature 36.7 °C (98.1 °F), temperature source Temporal, resp. rate 18, height 1.753 m (5' 9\"), weight 93.4 kg (205 lb 14.6 oz), SpO2 100%.    Relevant Results      Scheduled medications  Scheduled Medications[2]  Continuous medications  Continuous Medications[3]  PRN medications  PRN Medications[4]  Results for orders placed or performed during the hospital encounter of 04/18/25 (from the past 24 hours)   hCG, Urine, Qualitative   Result Value Ref Range    HCG, Urine NEGATIVE NEGATIVE       Imaging: MRI of the right shoulder reviewed by me demonstrates findings consistent with chronic anterior instability with a large Hill-Sachs defect, anterior-inferior glenoid labrum tearing, no evidence of glenoid bone loss, capacious anterior capsule, rotator cuff intact, glenohumeral synovitis.  Images were personally " reviewed and interpreted by me.  Radiology reports were reviewed by me as well, if readily available at the time.    Assessment/Plan   Assessment & Plan  Glenoid labrum tear, right, initial encounter    Synovitis of right shoulder    Instability of right shoulder joint    Hill Sachs deformity, right      35-year-old female here for right shoulder arthroscopy, anterior stabilization, labral repair, capsulorrhaphy, remplissage, extensive intra-articular debridement and synovectomy.  I thoroughly explained the risks and benefits as well as the expected postoperative timeline for the proposed procedure versus nonoperative management. Risks of this procedure include but are not limited to bleeding, infection, nerve injury, DVT/PE and failure of repair or implant.  The patient expressed understanding and wished to proceed with surgical intervention.  All questions were answered. They were consented to the above procedure at bedside.       I spent 15 minutes in the professional and overall care of this patient.      Adis Ledbetter MD         [1]   Family History  Problem Relation Name Age of Onset    Thyroid disease Mother      Heart disease Father          CABG x3 (6-2024)    Hypertension Father      COPD Maternal Grandmother     [2] ceFAZolin, 2 g, intravenous, Once  fentaNYL, 50 mcg, intravenous, Once  midazolam, 2 mg, intravenous, Once  scopolamine, 1 patch, transdermal, q72h    [3]    [4]

## 2025-04-18 NOTE — OP NOTE
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  Right shoulder arthroscopy, anterior stabilization, labral repair and capsulorrhaphy  2.  Right shoulder arthroscopic remplissage  3.  Right shoulder arthroscopic extensive intra-articular debridement and synovectomy    Surgeons      * Adis Ledbetter - Primary    Resident/Fellow/Other Assistant:  Surgeons and Role:     * Gabrielle Lopresti, PA-C - [facility]    Staff:   Circulator: Genevieve  Scrub Person: Yolanda Núñezub Person: Kniza Núñezub Person: Mykel Ren Circulator: Aubree    Anesthesia Staff: Anesthesiologist: Ector Eddy MD  C-AA: SYLVIA Tesfaye  Frontline Breaker: SYLVIA Sarmiento    Procedure Summary  Anesthesia: Anesthesia type not filed in the log.  ASA: I  Estimated Blood Loss: 5mL  Intra-op Medications:   Administrations occurring from 1030 to 1300 on 25:   Medication Name Total Dose   dexAMETHasone (Decadron) injection 4 mg/mL 4 mg   fentaNYL (Sublimaze) injection 50 mcg/mL 100 mcg   ketorolac (Toradol) 15 mg 30 mg   LR bolus Cannot be calculated   lidocaine (Xylocaine) injection 2 % 100 mg   ondansetron 2 mg/mL 4 mg   propofol (Diprivan) injection 10 mg/mL 200 mg   rocuronium (ZeMuron) 50 mg/5 mL injection 50 mg   ceFAZolin  (Ancef) 2 g in dextrose (iso)  mL 2 g          Anesthesia Record               Intraprocedure I/O Totals       None           Specimen: No specimens collected      Drains and/or Catheters: * None in log *    Tourniquet Times:         Implants:  Implants       Type Name Action Serial No.       KNOTLESS SUTURE ANCHOR COBRAID BLUE MINI TAPE 1.8MM Implanted       Q-FIX KNOTLESS ANCHOR 1.8MM MONO BLUE Implanted       Q-FIX KNOTLESS ANCHOR 1.8MM MONO BLUE Implanted       KNOTLESS SUTURE ANCHOR COBRAID BLUE MINI TAPE 1.8MM Implanted             Findings: Anterior-inferior glenoid labral tear, posterior humeral head Hill-Sachs defect, synovitis     Indications: Ruthann Nichole is an 35 y.o. female who is having surgery for right shoulder recurrent anterior shoulder instability, glenoid labral tear and Hill-Sachs defect.  MRI confirmed the above.  She failed nonoperative management.  After long discussion with the patient, she elected to proceed with surgical intervention form of a right shoulder arthroscopy, anterior stabilization, labral repair, capsulorrhaphy, remplissage, extensive intra-articular debridement and synovectomy.  I thoroughly explained the risks and benefits as well as the expected postoperative timeline for the proposed procedure versus nonoperative management. Risks of this procedure include but are not limited to bleeding, infection, nerve injury, DVT/PE and failure of repair or implant.  The patient expressed understanding and wished to proceed with surgical intervention.  All questions were answered. They were consented to the above procedure at bedside.    The patient was seen in the preoperative area. The risks, benefits, complications, treatment options, non-operative alternatives, expected recovery and outcomes were discussed with the patient. The possibilities of reaction to medication, pulmonary aspiration, injury to surrounding structures, bleeding, recurrent infection, the need for additional  procedures, failure to diagnose a condition, and creating a complication requiring transfusion or operation were discussed with the patient. The patient concurred with the proposed plan, giving informed consent.  The site of surgery was properly noted/marked if necessary per policy. The patient has been actively warmed in preoperative area. Preoperative antibiotics have been ordered and given within 1 hours of incision. Venous thrombosis prophylaxis have been ordered including bilateral sequential compression devices    Procedure Details:   The patient was seen in the preoperative holding area where the correct site and side were signed my initials. The patient was seen by the anesthesia team and a preoperative regional anesthetic block was administered.  The patient was brought back to the operating room after smooth induction of general anesthesia the patient was placed in the lateral position with a beanbag.  All bony prominences were padded.  The patient was belted and posted.  SCD boots were placed on bilateral lower extremities. An axillary role was placed to protect the down axilla. The contralateral arm was placed on an arm cosby.  The operative arm was prepped and draped in usual sterile fashion and placed in the lateral traction arm cosby.  Prior to the start of the procedure, preoperative antibiotics were administered by the anesthesia team.  Prior to incision, a preoperative time-out was performed confirming the correct patient, side, site and procedure to be performed.  All in the room were in agreement.    Preoperative examination under anesthesia: Anterior load-and-shift 2+, posterior stable    We began the procedure with the standard posterior portal to the shoulder.  Local anesthetic was administered at the posterior portal site and the posterior portal was established with an 11 blade through the skin.  The arthroscope was introduced atraumatically into the glenohumeral joint. Anterior and 7  o'clock portals established using an outside in technique with spinal needle.  The skin was incised with a #11 blade and a threaded cannula was inserted atraumatically. Diagnostic arthroscopy was performed demonstrating intact chondral surfaces on the glenoid and the humeral head.  There was no significant glenoid bone loss.  The labrum anteriorly demonstrated significant tearing from the mid body of the anterior labrum inferiorly around the bottom of the glenoid and the labrum was displaced medially.  A labral elevator was used to free the labrum of the anterior glenoid and mobilized the labral tissue to the front of the glenoid at the articular surface.  A gentle decortication of the anterior-inferior glenoid was performed arthroscopic shaver.    An extensive intra-articular debridement and synovectomy was performed in the anterior superior and posterior aspects of the glenohumeral joint, glenoid, glenoid labrum, humeral head, rotator cuff, middle glenohumeral ligament and capsular tissue with a combination of arthroscopic shaver and ablator.    A gentle decortication of the Hill-Sachs defect was performed in preparation for the remplissage procedure.  A separate portal was used to drill and insert the remplissage anchors.  The arm/anchors were inserted at the appropriate position within the Hill-Sachs defect without complication.  The sutures were later converted and tensioned after the anterior stabilization was completed.    The glenoid labrum was then repaired with knotless Q fix anchors x 4 which were drilled and inserted without complication at the 4, 5, 6, and 7 o'clock positions.  The suture lasso device was then used to pass the repair suture which was shuttled through the anchor using the preloaded ultra loop.  This was performed without complication and the sutures were sequentially tensioned demonstrating excellent repair of the glenoid labrum back to its attachment on the corner of the glenoid face.   Additionally, we performed a  capsular shift at the same time utilizing the suture lasso device, capturing sufficient capsule to decrease the volume of the glenohumeral joint and adding stability to the shoulder.  The inferior sling/glenohumeral ligament was retensioned with the inferior and posterior anchors.    Finally, we then tensioned the remplissage sutures.  Each repair suture was converted through the opposite anchor in order to complete the staple technique.  The repair sutures were then sequentially tensioned reducing the rotator cuff tissue into the Hill-Sachs defect.  This demonstrated excellent completion of the remplissage which was now stable to probing.    Arthroscopic photos were taken throughout.  Excess arthroscopic fluid was drained from the shoulder and the arthroscopic instruments were removed.      The wounds were thoroughly irrigated.  Portals were closed with buried interrupted 3-0 Monocryl sutures and dressed with Steri-Strips, dry gauze and Tegaderms.  The operative arm was placed in a shoulder sling with an abduction pillow with a cryotherapy pad placed on the operative shoulder.    At the end of the procedure all needle, lap and instrument counts were correct.    The patient was woken from anesthesia and transferred to the recovery room in stable condition.  The patient tolerated the procedure well.    I was present for all critical portions of this case.    Postoperative instructions:  The patient will remain in a shoulder sling with an abduction pillow at all times except for bathing.  The patient will return to see me in 2 weeks for a routine 2 week postoperative visit. They will begin PT according to the PT protocol appropriate for their surgery.    Gabrielle LoPresti, PA-C was present for the entire case.  Her assistance was necessary and critical to the successful completion of the procedure.  She provided skilled assistance with arm positioning, arthroscope manipulation, implant  insertion, and wound closure.  A qualified assistant was not available to perform her portion of the case.    Complications:  None; patient tolerated the procedure well.    Disposition: PACU - hemodynamically stable.  Condition: stable   Attending Attestation: I performed the procedure.    Adis Ledbetter  Phone Number: [phone]

## 2025-04-18 NOTE — ANESTHESIA PREPROCEDURE EVALUATION
Patient: Ruthann Nichole    Procedure Information       Date/Time: 04/18/25 1030    Procedure: Right shoulder arthroscopy, anterior stabilization, labral repair, capsulorrhaphy, remplissage, extensive intra-articular debridement and synovectomy (Right: Shoulder)    Location: [facility] 04 / [facility]    Surgeons: Adis Ledbetter MD            Relevant Problems   No relevant active problems       Clinical information reviewed:   Tobacco  Allergies  Meds   Med Hx  Surg Hx  OB Status  Fam Hx  Soc   Hx        NPO Detail:  NPO/Void Status  Carbohydrate Drink Given Prior to Surgery? : N  Date of Last Liquid: 04/18/25  Time of Last Liquid: 1930  Date of Last Solid: 04/18/25  Time of Last Solid: 1930  Time of Last Void: 0930         Physical Exam    Airway  Mallampati: I  TM distance: >3 FB  Neck ROM: full  Mouth opening: 3 or more finger widths     Cardiovascular - normal exam   Dental   Comments: 8, 9 crowns     Pulmonary - normal exam   Abdominal - normal exam         Anesthesia Plan    History of general anesthesia?: yes  History of complications of general anesthesia?: no    ASA 1     general and regional   (ETT, interscalene nerve block)  The patient is not a current smoker.    intravenous induction   Anesthetic plan and risks discussed with patient and spouse.    Plan discussed with CRNA and CAA.

## 2025-04-18 NOTE — ANESTHESIA POSTPROCEDURE EVALUATION
Patient: Ruthann Nichole    Procedure Summary       Date: 04/18/25 Room / Location: [facility] / [facility]    Anesthesia Start: 1110 Anesthesia Stop: 1248    Procedure: Right shoulder arthroscopy, anterior stabilization, labral repair, capsulorrhaphy, remplissage, extensive intra-articular debridement and synovectomy (Right: Shoulder) Diagnosis:       Glenoid labrum tear, right, initial encounter      Synovitis of right shoulder      Instability of right shoulder joint      Hill Sachs deformity, right      (Glenoid labrum tear, right, initial encounter [S43.431A])      (Synovitis of right shoulder [M65.911])      (Instability of right shoulder joint [M25.311])      (Hill Sachs deformity, 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])    Surgeons: Adis Ledbetter MD Responsible Provider: Ector Eddy MD    Anesthesia Type: general, regional ASA Status: 1            Anesthesia Type: general, regional    Vitals Value Taken Time   /69 04/18/25 12:51   Temp 36.8 °C (98.2 °F) 04/18/25 12:51   Pulse 87 04/18/25 12:51   Resp 16 04/18/25 12:51   SpO2 100 % 04/18/25 12:51       Anesthesia Post Evaluation    Patient location during evaluation: bedside  Patient participation: complete - patient participated  Level of consciousness: awake  Pain score: 3  Pain management: adequate  Multimodal analgesia pain management approach  Airway patency: patent  Two or more strategies used to mitigate risk of obstructive sleep apnea  Cardiovascular status: acceptable  Respiratory status: acceptable  Hydration status: acceptable  Postoperative Nausea and Vomiting: none        No notable events documented.

## 2025-04-18 NOTE — ANESTHESIA PROCEDURE NOTES
Peripheral Block    Patient location during procedure: pre-op  Start time: 4/18/2025 10:28 AM  End time: 4/18/2025 10:30 AM  Reason for block: at surgeon's request and post-op pain management  Staffing  Performed: attending   Authorized by: Ector Eddy MD    Performed by: Ector Eddy MD  Preanesthetic Checklist  Completed: patient identified, IV checked, site marked, risks and benefits discussed, surgical consent, monitors and equipment checked, pre-op evaluation and timeout performed   Timeout performed at: 4/18/2025 10:20 AM  Peripheral Block  Patient position: sitting  Prep: ChloraPrep  Patient monitoring: heart rate, cardiac monitor and continuous pulse ox  Block type: interscalene  Laterality: right  Injection technique: single-shot  Guidance: ultrasound guided  Local infiltration: bupivicaine  Infiltration strength: 0.5 %  Dose: 20 mL  Needle  Needle type: short-bevel   Needle gauge: 22 G  Needle length: 5 cm  Needle localization: ultrasound guidance     image stored in chart  Assessment  Injection assessment: negative aspiration for heme, no paresthesia on injection, incremental injection and local visualized surrounding nerve on ultrasound  Heart rate change: no  Slow fractionated injection: yes  Additional Notes  With decadron 5mg

## 2025-04-21 ASSESSMENT — PAIN SCALES - GENERAL: PAINLEVEL_OUTOF10: 4

## 2025-04-30 ENCOUNTER — APPOINTMENT (OUTPATIENT)
Dept: ORTHOPEDIC SURGERY | Age: 35
End: 2025-04-30
Payer: MEDICAID

## 2025-04-30 VITALS — WEIGHT: 202 LBS | BODY MASS INDEX: 29.92 KG/M2 | HEIGHT: 69 IN

## 2025-04-30 DIAGNOSIS — S43.431A GLENOID LABRAL TEAR, RIGHT, INITIAL ENCOUNTER: ICD-10-CM

## 2025-04-30 DIAGNOSIS — M25.311 INSTABILITY OF RIGHT SHOULDER JOINT: Primary | ICD-10-CM

## 2025-04-30 PROCEDURE — 1036F TOBACCO NON-USER: CPT

## 2025-04-30 PROCEDURE — 99024 POSTOP FOLLOW-UP VISIT: CPT

## 2025-04-30 PROCEDURE — 3008F BODY MASS INDEX DOCD: CPT

## 2025-04-30 ASSESSMENT — PAIN - FUNCTIONAL ASSESSMENT: PAIN_FUNCTIONAL_ASSESSMENT: NO/DENIES PAIN

## 2025-04-30 NOTE — PROGRESS NOTES
PRIMARY CARE PHYSICIAN: Balwinder Valencia MD    ORTHOPAEDIC POSTOPERATIVE VISIT    ASSESSMENT & PLAN    Impression: 35 y.o. female 2 weeks postop s/p Right shoulder arthroscopy, anterior stabilization, labral repair, capsulorrhaphy, remplissage, extensive intra-articular debridement and synovectomy 04/18/2025    Plan:   Overall Ruthann is doing well.  Her pain is well-controlled.  She will continue with her sling at all times except for hygiene and physical therapy.  She will begin working with physical therapy through the postoperative protocol for above.  We discussed her postop precautions and she expressed understanding.  She will ice and rest the shoulder as she needs.  She will follow-up with us in 4 weeks.    I reviewed the intraoperative findings with the patient and answered all their questions. I reviewed their postoperative timeline and plan with them. They understand the postoperative precautions and the treatment plan going forward.     Follow-Up: Patient will follow-up in 4 weeks, no x-rays    At the end of the visit, all questions were answered in full. The patient is in agreement with the plan and recommendations. They will call the office with any questions/concerns.      Note dictated with [employer] software. Completed without full typed error editing and sent to avoid delay.      SUBJECTIVE  CHIEF COMPLAINT: Post-op       HPI: Ruthann Nichole is a 35 y.o. patient now 2 weeks postop status post Right shoulder arthroscopy, anterior stabilization, labral repair, capsulorrhaphy, remplissage, extensive intra-articular debridement and synovectomy 04/18/2025. Pt states overall she is doing well. Has limited to no pain. No concerns at this time.     REVIEW OF SYSTEMS  Constitutional: See HPI for pain assessment, No significant recent weight gain or loss, no fever or chills  Cardiovascular: No chest pain, shortness of breath  Respiratory: No difficulty breathing, no cough  Gastrointestinal: No  nausea, vomiting, diarrhea, constipation  Musculoskeletal: Noted in HPI, positive for pain, restricted motion, stiffness  Integumentary: No rashes, easy bruising or skin lesions  Neurological: No headache, no numbness or tingling in extremities  Psychiatric: No mood changes or memory changes. No social issues  Heme/Lymph: No excessive swelling, easy bruising or excessive bleeding  ENT: No hearing changes, no nosebleeds  Eyes: No vision changes    CURRENT MEDICATIONS:   Current Medications[1]     OBJECTIVE    PHYSICAL EXAM      4/18/2025    10:45 AM 4/18/2025    11:00 AM 4/18/2025    12:51 PM 4/18/2025     1:05 PM 4/18/2025     1:20 PM 4/18/2025     1:21 PM 4/18/2025     1:55 PM   Vitals   Systolic 119 117 123 109 111 109 117   Diastolic 58 58 69 65 63 69 71   Heart Rate 66 63 87 96 81 81 67   Temp   36.8 °C (98.2 °F)  36.8 °C (98.2 °F) 36.8 °C (98.2 °F) 36.7 °C (98.1 °F)   Resp 20 20 16 16 16 16       There is no height or weight on file to calculate BMI.    General: Well-appearing, no acute distress    Skin intact bilateral upper and lower extremities  No erythema  No warmth    Detailed examination of the right shoulder demonstrates:  Surgical incision(s) healing well  No erythema or warmth  No drainage  No ecchymosis  Resolving swelling, minimal  Biceps contour normal  Shoulder range of motion deferred  Upper extremity motor grossly intact  C5-T1 sensation intact bilaterally  2+ radial pulses bilaterally  Warm and well-perfused, brisk capillary refill    IMAGING:   No new imaging         [1]   Current Outpatient Medications   Medication Sig Dispense Refill    acetaminophen (Tylenol) 500 mg tablet Take 2 tablets (1,000 mg) by mouth every 8 hours if needed for mild pain (1 - 3) for up to 20 days. 60 tablet 1    aspirin 81 mg EC tablet Take 1 tablet (81 mg) by mouth 2 times a day for 15 days. 30 tablet 0    cyanocobalamin (Vitamin B-12) 1,000 mcg tablet Take 1 tablet (1,000 mcg) by mouth 4 times a week.       tirzepatide (Mounjaro) 15 mg/0.5 mL pen injector Inject 15 mg under the skin 1 (one) time per week. Compound with B12 1000 mcg weekly (Patient taking differently: Inject 15 mg under the skin 1 (one) time per week. Compound with B12 1000 mcg weekly; 3-13-25 pt reports taking 2.5mg every Thursday for weight loss) 2 mL 2     No current facility-administered medications for this visit.

## 2025-05-09 ENCOUNTER — EVALUATION (OUTPATIENT)
Dept: PHYSICAL THERAPY | Facility: HOSPITAL | Age: 35
End: 2025-05-09
Payer: MEDICAID

## 2025-05-09 DIAGNOSIS — S43.431A GLENOID LABRAL TEAR, RIGHT, INITIAL ENCOUNTER: ICD-10-CM

## 2025-05-09 DIAGNOSIS — M25.311 INSTABILITY OF RIGHT SHOULDER JOINT: Primary | ICD-10-CM

## 2025-05-09 DIAGNOSIS — M25.611 DECREASED RANGE OF MOTION OF RIGHT SHOULDER: ICD-10-CM

## 2025-05-09 PROCEDURE — 97161 PT EVAL LOW COMPLEX 20 MIN: CPT | Mod: GP | Performed by: PHYSICAL THERAPIST

## 2025-05-09 PROCEDURE — 97110 THERAPEUTIC EXERCISES: CPT | Mod: GP | Performed by: PHYSICAL THERAPIST

## 2025-05-09 PROCEDURE — 97140 MANUAL THERAPY 1/> REGIONS: CPT | Mod: GP | Performed by: PHYSICAL THERAPIST

## 2025-05-09 ASSESSMENT — ENCOUNTER SYMPTOMS
DEPRESSION: 0
LOSS OF SENSATION IN FEET: 0
OCCASIONAL FEELINGS OF UNSTEADINESS: 0

## 2025-05-09 ASSESSMENT — PAIN DESCRIPTION - DESCRIPTORS: DESCRIPTORS: THROBBING

## 2025-05-09 ASSESSMENT — ACTIVITIES OF DAILY LIVING (ADL): EFFECT OF PAIN ON DAILY ACTIVITIES: LIMITS

## 2025-05-09 ASSESSMENT — PATIENT HEALTH QUESTIONNAIRE - PHQ9
2. FEELING DOWN, DEPRESSED OR HOPELESS: NOT AT ALL
SUM OF ALL RESPONSES TO PHQ9 QUESTIONS 1 AND 2: 0
1. LITTLE INTEREST OR PLEASURE IN DOING THINGS: NOT AT ALL

## 2025-05-09 ASSESSMENT — PAIN - FUNCTIONAL ASSESSMENT: PAIN_FUNCTIONAL_ASSESSMENT: 0-10

## 2025-05-09 ASSESSMENT — PAIN SCALES - GENERAL: PAINLEVEL_OUTOF10: 4

## 2025-05-09 NOTE — PROGRESS NOTES
Physical Therapy    Physical Therapy Upper Extremity Evaluation    Patient Name: Ruthann Nichole  MRN: [id number]  : 1990  Today's Date: 2025  Time Calculation  Start Time: 1105  Stop Time: 1158  Time Calculation (min): 53 min  PT Evaluation Time Entry  PT Evaluation (Low) Time Entry: 30  PT Therapeutic Procedures Time Entry  Manual Therapy Time Entry: 15  Therapeutic Exercise Time Entry: 8             Visit: 1  Auth:     Current Problem  Problem List Items Addressed This Visit           ICD-10-CM       Musculoskeletal and Injuries    Decreased range of motion of right shoulder M25.611    Instability of right shoulder joint - Primary M25.311    Relevant Orders    Follow Up In Physical Therapy     Other Visit Diagnoses         Codes      Glenoid labral tear, right, initial encounter     S43.431A    Relevant Orders    Follow Up In Physical Therapy               General  Name and  confirmed with patient on this date.   Pt repports issues with her R shoulder since she was a child, however dislocation beame more and more of an issue recently and she underwent right shoulder arthroscopy, anterior stabilization, labral repair, capsulorrhaphy, remplissage, extensive intra-articular debridement and synovectom 25.    Ambulatory Screening Summary     Screening  Frequency  Date Last Completed   Spiritual and Cultural Beliefs   Screening each visit or episode of care 2025   Falls Risk Screening every ambulatory visit 2025 11:10 AM   Pain Screening annually at primary care visit     Domestic Violence screening annually at primary care visit    Depression Screening annually in the primary care setting 2025   Suicide Risk Screening annually in the primary care setting 2025   Nutrition and Food Insecurity   Screening at least annually at primary care visit     Key Learner annually in the primary care setting 2025   Drug Screen  2025  9:34 AM   Alcohol Screen  2025  9:34 AM   Advance  Directive         Precautions   See protocol below.       Pain  Pain Assessment: 0-10  0-10 (Numeric) Pain Score: 4 (up to 5 at times)  Pain Type: Surgical pain  Pain Location: Shoulder  Pain Orientation: Right  Pain Radiating Towards: Elbow  Pain Descriptors: Throbbing  Pain Frequency: Intermittent  Clinical Progression: Gradually improving  Effect of Pain on Daily Activities: Limits  Pain Interventions: Medication (See MAR)  Response to Interventions: Decrease in pain    SUBJECTIVE:   Chief complaint:  R shoulder pain post-op    Prior level of function: indep, working full time  Work: trains horses  Patients goal: resume normal function, eliminate pain and restrictions    OBJECTIVE:  Cervical ROM WNL    Upper Extremity ROM: WNL unless documented below:  ROM in Degrees RIGHT LEFT   Shoulder Flexion 49    Shoulder Abduction 45 (restricted by protocol)    Shoulder ER arm at side -10 to neutral    Shoulder IR To belly    Elbow Flexion WNL    Elbow Extension WNL    Wrist Flexion WNL    Wrist Extension WNL      Upper Extremity Strength: NT due to protocol restrictions  Posture: forward head, rounded shoulders  Palpation: mild TTP diffuse R shoulder/arm  Neurological symptoms: none      TREATMENT:   No e-stim  EXERCISE Date 5/9/2025   Date Date Date    REPS REPS REPS REPS                        Pulleys      Flexion to 90 deg       Pulleys      Scaption/Abduction to 45 deg       Pulleys      IR (5/30/25)                                                        Wand flexion supine (within precautions)       Wand abduction supine (within precautions)                     UE RANGER              Shoulder isometrics arm at side  FF/ER/IR/ABD/ADD (5/16/25)              Scapular protraction  (5/16/25)       Scapular retraction (5/16/25)              Manual PROM (within precautions) 15 min             Cold pack PRN              HEP: Access Code: [email or web address]  URL: [email or web address]  Date: 05/09/2025  Prepared  by: Serjio Butler    Exercises  - Supine Shoulder Flexion Extension AAROM with Dowel  - 2-3 x daily - 5-7 x weekly - 3 sets - 10 reps - 3 sec hold  - Supine Shoulder Abduction AAROM with Dowel  - 2-3 x daily - 5-7 x weekly - 3 sets - 10 reps  - Supine Shoulder External Rotation with Dowel  - 2-3 x daily - 5-7 x weekly - 3 sets - 10 reps 8 min      PHASE I (Weeks 2 - 6):   Sling: At all times, except for hygiene and exercises  Range of Motion:   Weeks 2-4: PROM and AAROM including FF to 90° and ER to neutral with arm at side, IR to stomach, 45° ABD  Weeks 4-6: PROM and AAROM including FF to 120°, ER to 20° with arm at side, ABD to 90°   NO combined ABD-ER   Exercise:   Weeks 2-4: Isometric exercises in sling  Week 4: Begin scapular stabilizers (protraction, retraction). Begin gentle isometrics with arm at side-FF/ER/IR/ABD/ADD  NO combined ABD-ER   Modalities: Per therapist, including electrical stimulation, ultrasound, heat (before), ice (after)     PHASE II (Weeks 6 - 12):   Sling: Discontinue at 6 weeks  Range of Motion: Begin AROM in all planes. 160° FF / 45° ER at side / 160° ABD / IR behind back to waist. No manipulations  Week 8+: Progress motion in all planes as tolerated  Exercise: Continue Phase I; posterior glides are okay (no anterior glides)   Weeks 6-8: Progress to resisted isometrics  Weeks 8-10: Progress to resisted exercises with therabands  Weeks 10-12: Progress to light weights (1-5 lbs); 8-12 reps/2-3 sets per rotator cuff, deltoid, and scapular stabilizers with low abduction angles  Modalities: Per therapist, including electrical stimulation, ultrasound, heat (before), ice (after)     PHASE III (Weeks 12 - 24):   Sling: None  Range of Motion: Full  Exercise: Continue Phase II, advance as tolerated.   Sport related conditioning at 3 months  May return to weight room at 4 months   Begin sport specific exercises at 4 months  Return to throwing at 4.5 months  Throw from pitcher's mound at 6  months  Modalities: Per therapist, including electrical stimulation, ultrasound, heat (before), ice (after)   Consider return to competitive sports, including contact sports by 4-6 months, if approved      ASSESSEMENT  Pt is a 35 y.o. referred to physical therapy for a dx of R shoulder instability by Lopresti, Gabrielle, PA* . Pt with signs and symptoms of pain, loss of motion, loss of strength, reduced function and reduced posture. This pt would benefit from a therapy program to restore prior level of function, reduce pain, increase AROM, increase strength, and improve posture.    The physical therapy prognosis is good for the patient to achieve their goals.   The pt tolerated therapy treatment today well with no adverse effects.  Barriers to therapy include:  none    PLAN  OP PT Plan  Treatment/Interventions: Cryotherapy, Education/ Instruction, Manual therapy, Therapeutic activities, Therapeutic exercises, Ultrasound  PT Plan: Skilled PT  PT Frequency: 2 times per week  Duration: 12 weeks  Onset Date: 04/18/25  Certification Period Start Date: 05/09/25  Certification Period End Date: 08/09/25  Rehab Potential: Good  Plan of Care Agreement: Patient    The patient will benefit from physical therapy treatment to include: Treatment/Interventions: Cryotherapy, Education/ Instruction, Manual therapy, Therapeutic activities, Therapeutic exercises, Ultrasound    Goals:  Active       PT Problem       PT Goal 1       Start:  05/09/25    Expected End:  08/09/25       Pt will increase R UE strength to 4+/5 or greater in order to improve tolerance to functional activities and safely return to work as a .          PT Goal 2       Start:  05/09/25    Expected End:  08/09/25       Pt will increase R shoulder ROM to WNL in order to reduce pain and improve tolerance to functional mobility and safely RTW as a .          PT Goal 3       Start:  05/09/25    Expected End:  08/09/25       Pt will decrease pain  to 1/10 at worst, with pt able to manage symptoms with HEP, positioning and modalities independently.          PT Goal 4       Start:  05/09/25    Expected End:  08/09/25       Pt will be able to teach back 90% of HEP in order to maintain gains made in PT.

## 2025-05-12 ENCOUNTER — TREATMENT (OUTPATIENT)
Dept: PHYSICAL THERAPY | Facility: HOSPITAL | Age: 35
End: 2025-05-12
Payer: MEDICAID

## 2025-05-12 DIAGNOSIS — M25.311 INSTABILITY OF RIGHT SHOULDER JOINT: ICD-10-CM

## 2025-05-12 DIAGNOSIS — S43.431A GLENOID LABRAL TEAR, RIGHT, INITIAL ENCOUNTER: ICD-10-CM

## 2025-05-12 PROCEDURE — 97140 MANUAL THERAPY 1/> REGIONS: CPT | Mod: GP | Performed by: PHYSICAL THERAPIST

## 2025-05-12 PROCEDURE — 97110 THERAPEUTIC EXERCISES: CPT | Mod: GP | Performed by: PHYSICAL THERAPIST

## 2025-05-12 ASSESSMENT — PAIN SCALES - GENERAL: PAINLEVEL_OUTOF10: 0 - NO PAIN

## 2025-05-12 ASSESSMENT — PAIN - FUNCTIONAL ASSESSMENT: PAIN_FUNCTIONAL_ASSESSMENT: 0-10

## 2025-05-12 NOTE — PROGRESS NOTES
Physical Therapy    Physical Therapy Treatment    Patient Name: Ruthann Nichole  MRN: [id number]  : 1990   Today's Date: 2025  Time Calculation  Start Time: 0815  Stop Time: 0900  Time Calculation (min): 45 min  PT Therapeutic Procedures Time Entry  Manual Therapy Time Entry: 15  Therapeutic Exercise Time Entry: 25           Visit Number: 2  Auth: AUTH #[id number] PENDING - SCHEDULED DUE TO POST-OP REQUIREMENTS    Current Problem  Problem List Items Addressed This Visit           ICD-10-CM       Musculoskeletal and Injuries    Instability of right shoulder joint M25.311     Other Visit Diagnoses         Codes      Glenoid labral tear, right, initial encounter     S43.431A             Subjective   Pt reports some difficulty with her HEP,  unsure of form, especially with wand ER.  Following education pt is confident with her HEP.    Precautions   See protocol.    Pain  Pain Assessment: 0-10  0-10 (Numeric) Pain Score: 0 - No pain  Pain Type: Surgical pain  Pain Location: Shoulder  Pain Orientation: Right    Objective      No new abnormalities observed.    Outcome Measures:   Not today.    Treatments:   No e-stim  EXERCISE Date 2025   Date 2025   Date Date    REPS REPS REPS REPS     Visit #2                   Pulleys      Flexion to 90 deg  3 min     Pulleys      Scaption/Abduction to 45 deg  3 min     Pulleys      IR (25)                                                        Wand flexion supine (within precautions)  2x10     Wand abduction supine (within precautions)  2x10     Wand ER supine (within precautions)  2x10            UE RANGER flexion  Scaption  2x10  2x10            Shoulder isometrics arm at side  FF/ER/IR/ABD/ADD (25)              Scapular protraction  (25)       Scapular retraction (25)              Manual PROM (within precautions) 15 min 15 min            Cold pack PRN              HEP: Access Code: [email or web address]  URL: [email or web address]  Date:  05/09/2025  Prepared by: Serjio Butler    Exercises  - Supine Shoulder Flexion Extension AAROM with Dowel  - 2-3 x daily - 5-7 x weekly - 3 sets - 10 reps - 3 sec hold  - Supine Shoulder Abduction AAROM with Dowel  - 2-3 x daily - 5-7 x weekly - 3 sets - 10 reps  - Supine Shoulder External Rotation with Dowel  - 2-3 x daily - 5-7 x weekly - 3 sets - 10 reps 8 min Reviewed     PHASE I (Weeks 2 - 6):   Sling: At all times, except for hygiene and exercises  Range of Motion:   Weeks 2-4: PROM and AAROM including FF to 90° and ER to neutral with arm at side, IR to stomach, 45° ABD  Weeks 4-6: PROM and AAROM including FF to 120°, ER to 20° with arm at side, ABD to 90°   NO combined ABD-ER   Exercise:   Weeks 2-4: Isometric exercises in sling  Week 4: Begin scapular stabilizers (protraction, retraction). Begin gentle isometrics with arm at side-FF/ER/IR/ABD/ADD  NO combined ABD-ER   Modalities: Per therapist, including electrical stimulation, ultrasound, heat (before), ice (after)     PHASE II (Weeks 6 - 12):   Sling: Discontinue at 6 weeks  Range of Motion: Begin AROM in all planes. 160° FF / 45° ER at side / 160° ABD / IR behind back to waist. No manipulations  Week 8+: Progress motion in all planes as tolerated  Exercise: Continue Phase I; posterior glides are okay (no anterior glides)   Weeks 6-8: Progress to resisted isometrics  Weeks 8-10: Progress to resisted exercises with therabands  Weeks 10-12: Progress to light weights (1-5 lbs); 8-12 reps/2-3 sets per rotator cuff, deltoid, and scapular stabilizers with low abduction angles  Modalities: Per therapist, including electrical stimulation, ultrasound, heat (before), ice (after)     PHASE III (Weeks 12 - 24):   Sling: None  Range of Motion: Full  Exercise: Continue Phase II, advance as tolerated.   Sport related conditioning at 3 months  May return to weight room at 4 months   Begin sport specific exercises at 4 months  Return to throwing at 4.5 months  Throw  from pitcher's mound at 6 months  Modalities: Per therapist, including electrical stimulation, ultrasound, heat (before), ice (after)   Consider return to competitive sports, including contact sports by 4-6 months, if approved    Assessment:   Pt tolerated treatment without incidence. She is very aware of her ROM and protocol restrictions and careful not to breech them. She tolerated increased manual PROM this visit and demonstrated decreased mm. Guarding today.  Patient reports 2 pain after treatment today.    Plan:   OP PT Plan  Treatment/Interventions: Cryotherapy, Education/ Instruction, Manual therapy, Therapeutic activities, Therapeutic exercises, Ultrasound  PT Plan: Skilled PT  PT Frequency: 2 times per week  Duration: 12 weeks  Onset Date: 04/18/25  Certification Period Start Date: 05/09/25  Certification Period End Date: 08/09/25  Rehab Potential: Good  Plan of Care Agreement: Patient    Goals:  Active       PT Problem       PT Goal 1       Start:  05/09/25    Expected End:  08/09/25       Pt will increase R UE strength to 4+/5 or greater in order to improve tolerance to functional activities and safely return to work as a .          PT Goal 2       Start:  05/09/25    Expected End:  08/09/25       Pt will increase R shoulder ROM to WNL in order to reduce pain and improve tolerance to functional mobility and safely RTW as a .          PT Goal 3       Start:  05/09/25    Expected End:  08/09/25       Pt will decrease pain to 1/10 at worst, with pt able to manage symptoms with HEP, positioning and modalities independently.          PT Goal 4       Start:  05/09/25    Expected End:  08/09/25       Pt will be able to teach back 90% of HEP in order to maintain gains made in PT.

## 2025-05-15 ENCOUNTER — TREATMENT (OUTPATIENT)
Dept: PHYSICAL THERAPY | Facility: HOSPITAL | Age: 35
End: 2025-05-15
Payer: MEDICAID

## 2025-05-15 DIAGNOSIS — M25.311 INSTABILITY OF RIGHT SHOULDER JOINT: ICD-10-CM

## 2025-05-15 DIAGNOSIS — S43.431A GLENOID LABRAL TEAR, RIGHT, INITIAL ENCOUNTER: ICD-10-CM

## 2025-05-15 PROCEDURE — 97140 MANUAL THERAPY 1/> REGIONS: CPT | Mod: GP,CQ

## 2025-05-15 PROCEDURE — 97110 THERAPEUTIC EXERCISES: CPT | Mod: GP,CQ

## 2025-05-15 ASSESSMENT — PAIN SCALES - GENERAL: PAINLEVEL_OUTOF10: 0 - NO PAIN

## 2025-05-15 ASSESSMENT — PAIN - FUNCTIONAL ASSESSMENT: PAIN_FUNCTIONAL_ASSESSMENT: 0-10

## 2025-05-15 NOTE — PROGRESS NOTES
Physical Therapy    Physical Therapy Treatment    Patient Name: Ruthann Nichole  MRN: [id number]  : 1990  Today's Date: 5/15/2025  Time Calculation  Start Time: 1300  Stop Time: 1345  Time Calculation (min): 45 min    PT Therapeutic Procedures Time Entry  Manual Therapy Time Entry: 20  Therapeutic Exercise Time Entry: 25          VISIT:# 3    Current Problem  Problem List Items Addressed This Visit           ICD-10-CM       Musculoskeletal and Injuries    Instability of right shoulder joint M25.311     Other Visit Diagnoses         Codes      Glenoid labral tear, right, initial encounter     S43.431A             Subjective    Pt name and  confirmed at the beginning of the session. Pt reports having no pain in her R shoulder at this moment. She also states that she has not been using pain medication as of late d/t to the lack of pain. She has had no recent falls and continues to be compliant with sling and HEP.      Pain  Pain Assessment: 0-10  0-10 (Numeric) Pain Score: 0 - No pain  Pain Type: Surgical pain  Pain Location: Shoulder  Pain Orientation: Right  Pain Interventions: Medication (See MAR)  Response to Interventions: Decrease in pain       Objective    Pt able to meet all ROM measurements as allowed by protocol.             Precautions   See protocol below.      Treatments:     EXERCISE Date 2025   Date 2025   Date 5/15/2025  Date    REPS REPS REPS REPS     Visit #2 #3                  Pulleys      Flexion to 90 deg  3 min 3 min    Pulleys      Scaption/Abduction to 45 deg  3 min 3 min    Pulleys      IR (25)                                                        Wand flexion supine (within precautions)  2x10 2x10    Wand abduction supine (within precautions)  2x10 2x10    Wand ER supine (within precautions)  2x10 2x10           UE RANGER flexion  Scaption  2x10  2x10 2x10  2x10           Shoulder isometrics arm at side  FF/ER/IR/ABD/ADD (25)              Scapular protraction   (5/16/25)       Scapular retraction (5/16/25)              Manual PROM (within precautions) 15 min 15 min 20 min           Cold pack PRN              HEP: Access Code: [email or web address]  URL: [email or web address]  Date: 05/09/2025  Prepared by: Serjio Butler    Exercises  - Supine Shoulder Flexion Extension AAROM with Dowel  - 2-3 x daily - 5-7 x weekly - 3 sets - 10 reps - 3 sec hold  - Supine Shoulder Abduction AAROM with Dowel  - 2-3 x daily - 5-7 x weekly - 3 sets - 10 reps  - Supine Shoulder External Rotation with Dowel  - 2-3 x daily - 5-7 x weekly - 3 sets - 10 reps 8 min Reviewed     PHASE I (Weeks 2 - 6):   Sling: At all times, except for hygiene and exercises  Range of Motion:   Weeks 2-4: PROM and AAROM including FF to 90° and ER to neutral with arm at side, IR to stomach, 45° ABD  Weeks 4-6: PROM and AAROM including FF to 120°, ER to 20° with arm at side, ABD to 90°   NO combined ABD-ER   Exercise:   Weeks 2-4: Isometric exercises in sling  Week 4: Begin scapular stabilizers (protraction, retraction). Begin gentle isometrics with arm at side-FF/ER/IR/ABD/ADD  NO combined ABD-ER   Modalities: Per therapist, including electrical stimulation, ultrasound, heat (before), ice (after)     PHASE II (Weeks 6 - 12):   Sling: Discontinue at 6 weeks  Range of Motion: Begin AROM in all planes. 160° FF / 45° ER at side / 160° ABD / IR behind back to waist. No manipulations  Week 8+: Progress motion in all planes as tolerated  Exercise: Continue Phase I; posterior glides are okay (no anterior glides)   Weeks 6-8: Progress to resisted isometrics  Weeks 8-10: Progress to resisted exercises with therabands  Weeks 10-12: Progress to light weights (1-5 lbs); 8-12 reps/2-3 sets per rotator cuff, deltoid, and scapular stabilizers with low abduction angles  Modalities: Per therapist, including electrical stimulation, ultrasound, heat (before), ice (after)     PHASE III (Weeks 12 - 24):   Sling: None  Range  of Motion: Full  Exercise: Continue Phase II, advance as tolerated.   Sport related conditioning at 3 months  May return to weight room at 4 months   Begin sport specific exercises at 4 months  Return to throwing at 4.5 months  Throw from pitcher's mound at 6 months  Modalities: Per therapist, including electrical stimulation, ultrasound, heat (before), ice (after)   Consider return to competitive sports, including contact sports by 4-6 months, if approved                        Assessment:   At the end of the session pt reported that she had a slight increase in pain in her R shoulder to a 2/10. She stated that her R shoulder felt sore. Pt was educated to ice tonight if soreness continues to linger. Overall she tolerated treatment well and had no complaints of pain or discomfort throughout the session.       Plan:   OP PT Plan  Treatment/Interventions: Cryotherapy, Education/ Instruction, Manual therapy, Therapeutic activities, Therapeutic exercises, Ultrasound  PT Plan: Skilled PT  PT Frequency: 2 times per week  Duration: 12 weeks  Onset Date: 04/18/25  Certification Period Start Date: 05/09/25  Certification Period End Date: 08/09/25  Rehab Potential: Good  Plan of Care Agreement: Patient    Goals:  Active       PT Problem       PT Goal 1       Start:  05/09/25    Expected End:  08/09/25       Pt will increase R UE strength to 4+/5 or greater in order to improve tolerance to functional activities and safely return to work as a .          PT Goal 2       Start:  05/09/25    Expected End:  08/09/25       Pt will increase R shoulder ROM to WNL in order to reduce pain and improve tolerance to functional mobility and safely RTW as a .          PT Goal 3       Start:  05/09/25    Expected End:  08/09/25       Pt will decrease pain to 1/10 at worst, with pt able to manage symptoms with HEP, positioning and modalities independently.          PT Goal 4       Start:  05/09/25    Expected End:   08/09/25       Pt will be able to teach back 90% of HEP in order to maintain gains made in PT.

## 2025-05-19 ENCOUNTER — TREATMENT (OUTPATIENT)
Dept: PHYSICAL THERAPY | Facility: HOSPITAL | Age: 35
End: 2025-05-19
Payer: MEDICAID

## 2025-05-19 DIAGNOSIS — M25.311 INSTABILITY OF RIGHT SHOULDER JOINT: ICD-10-CM

## 2025-05-19 DIAGNOSIS — S43.431A GLENOID LABRAL TEAR, RIGHT, INITIAL ENCOUNTER: ICD-10-CM

## 2025-05-19 PROCEDURE — 97140 MANUAL THERAPY 1/> REGIONS: CPT | Mod: GP | Performed by: PHYSICAL THERAPIST

## 2025-05-19 PROCEDURE — 97110 THERAPEUTIC EXERCISES: CPT | Mod: GP | Performed by: PHYSICAL THERAPIST

## 2025-05-19 ASSESSMENT — PAIN SCALES - GENERAL: PAINLEVEL_OUTOF10: 0 - NO PAIN

## 2025-05-19 ASSESSMENT — PAIN - FUNCTIONAL ASSESSMENT: PAIN_FUNCTIONAL_ASSESSMENT: 0-10

## 2025-05-19 NOTE — PROGRESS NOTES
Physical Therapy    Physical Therapy Treatment    Patient Name: Ruthann Nichole  MRN: [id number]  : 1990   Today's Date: 2025  Time Calculation  Start Time: 1433  Stop Time: 1518  Time Calculation (min): 45 min  PT Therapeutic Procedures Time Entry  Manual Therapy Time Entry: 10  Therapeutic Exercise Time Entry: 35           Visit Number: 4  Auth: Continue with current program    Current Problem  Problem List Items Addressed This Visit           ICD-10-CM       Musculoskeletal and Injuries    Instability of right shoulder joint M25.311     Other Visit Diagnoses         Codes      Glenoid labral tear, right, initial encounter     S43.431A             Subjective   Pt states she is feeling good today and has no pain. On Saturday she was taking the temperature of one of her horses and it smashed her shoulder into a wall. Reports she had pain for about 5 minutes, but then the pain subsided. She does state that she is probably doing more than she is supposed to be doing out of necessity and caring for her animals, however she is careful not to go past her ROM precautions and does the bare minimum.      Precautions   See protocol below.    Pain  Pain Assessment: 0-10  0-10 (Numeric) Pain Score: 0 - No pain  Pain Type: Surgical pain  Pain Location: Shoulder  Pain Orientation: Right    Objective      Added isometrics and scapular protraction/retraction.    Outcome Measures:   Not today.    Treatments: DOS: 25  EXERCISE Date 2025   Date 5/15/2025  Date 2025      REPS REPS REPS    Visit #2 #3 Visit #4               Pulleys      Flexion to 90 deg 3 min 3 min 3 min   Pulleys      Scaption/Abduction to 45 deg 3 min 3 min 3 min   Pulleys      IR (25)                                                Wand flexion supine (within precautions) 2x10 2x10 2x10   Wand abduction supine (within precautions) 2x10 2x10 2x10   Wand ER supine (within precautions) 2x10 2x10 2x10         UE RANGER flexion  Scaption  2x10  2x10 2x10  2x10 2x10  hold         Shoulder isometrics arm at side  FF/ER/IR/ABD/ADD (5/16/25)   2x10, 3 sec hold all         Scapular protraction  (5/16/25)   10x   Scapular retraction (5/16/25)   10x         Manual PROM (within precautions) 15 min 20 min 10 min         Cold pack PRN            HEP: Access Code: [email or web address]  URL: [email or web address]  Date: 05/09/2025  Prepared by: Serjio Butler    Exercises  - Supine Shoulder Flexion Extension AAROM with Dowel  - 2-3 x daily - 5-7 x weekly - 3 sets - 10 reps - 3 sec hold  - Supine Shoulder Abduction AAROM with Dowel  - 2-3 x daily - 5-7 x weekly - 3 sets - 10 reps  - Supine Shoulder External Rotation with Dowel  - 2-3 x daily - 5-7 x weekly - 3 sets - 10 reps Reviewed     PHASE I (Weeks 2 - 6):   Sling: At all times, except for hygiene and exercises  Range of Motion:   Weeks 2-4: PROM and AAROM including FF to 90° and ER to neutral with arm at side, IR to stomach, 45° ABD  Weeks 4-6: PROM and AAROM including FF to 120°, ER to 20° with arm at side, ABD to 90°   NO combined ABD-ER   Exercise:   Weeks 2-4: Isometric exercises in sling  Week 4: Begin scapular stabilizers (protraction, retraction). Begin gentle isometrics with arm at side-FF/ER/IR/ABD/ADD  NO combined ABD-ER   Modalities: Per therapist, including electrical stimulation, ultrasound, heat (before), ice (after)     PHASE II (Weeks 6 - 12):   Sling: Discontinue at 6 weeks  Range of Motion: Begin AROM in all planes. 160° FF / 45° ER at side / 160° ABD / IR behind back to waist. No manipulations  Week 8+: Progress motion in all planes as tolerated  Exercise: Continue Phase I; posterior glides are okay (no anterior glides)   Weeks 6-8: Progress to resisted isometrics  Weeks 8-10: Progress to resisted exercises with therabands  Weeks 10-12: Progress to light weights (1-5 lbs); 8-12 reps/2-3 sets per rotator cuff, deltoid, and scapular stabilizers with low abduction  angles  Modalities: Per therapist, including electrical stimulation, ultrasound, heat (before), ice (after)     PHASE III (Weeks 12 - 24):   Sling: None  Range of Motion: Full  Exercise: Continue Phase II, advance as tolerated.   Sport related conditioning at 3 months  May return to weight room at 4 months   Begin sport specific exercises at 4 months  Return to throwing at 4.5 months  Throw from pitcher's mound at 6 months  Modalities: Per therapist, including electrical stimulation, ultrasound, heat (before), ice (after)   Consider return to competitive sports, including contact sports by 4-6 months, if approved            Assessment:   Initiated isometrics and scapular protraction/retraction per protocol. Pt tolerated with moderate difficulty due to fatigue weakness. States she felt relief with scapular retraction. Increased pain reported was due to mm. Soreness, not symptomatic pain.  Patient reports 1 pain after treatment today.    Plan:   Continue with current program focusing on ROM and pain improvements within protocol restrictions.      Goals:  Active       PT Problem       PT Goal 1       Start:  05/09/25    Expected End:  08/09/25       Pt will increase R UE strength to 4+/5 or greater in order to improve tolerance to functional activities and safely return to work as a .          PT Goal 2       Start:  05/09/25    Expected End:  08/09/25       Pt will increase R shoulder ROM to WNL in order to reduce pain and improve tolerance to functional mobility and safely RTW as a .          PT Goal 3       Start:  05/09/25    Expected End:  08/09/25       Pt will decrease pain to 1/10 at worst, with pt able to manage symptoms with HEP, positioning and modalities independently.          PT Goal 4       Start:  05/09/25    Expected End:  08/09/25       Pt will be able to teach back 90% of HEP in order to maintain gains made in PT.

## 2025-05-22 ENCOUNTER — TREATMENT (OUTPATIENT)
Dept: PHYSICAL THERAPY | Facility: HOSPITAL | Age: 35
End: 2025-05-22
Payer: MEDICAID

## 2025-05-22 DIAGNOSIS — S43.431A GLENOID LABRAL TEAR, RIGHT, INITIAL ENCOUNTER: ICD-10-CM

## 2025-05-22 DIAGNOSIS — M25.311 INSTABILITY OF RIGHT SHOULDER JOINT: ICD-10-CM

## 2025-05-22 PROCEDURE — 97110 THERAPEUTIC EXERCISES: CPT | Mod: GP,CQ | Performed by: PHYSICAL THERAPY ASSISTANT

## 2025-05-22 PROCEDURE — 97140 MANUAL THERAPY 1/> REGIONS: CPT | Mod: GP,CQ | Performed by: PHYSICAL THERAPY ASSISTANT

## 2025-05-22 ASSESSMENT — PAIN - FUNCTIONAL ASSESSMENT: PAIN_FUNCTIONAL_ASSESSMENT: 0-10

## 2025-05-22 ASSESSMENT — PAIN SCALES - GENERAL: PAINLEVEL_OUTOF10: 1

## 2025-05-22 NOTE — PROGRESS NOTES
"Physical Therapy    Physical Therapy Treatment    Patient Name: Ruthann Nichole  MRN: [id number]  : 1990   Today's Date: 2025  Time Calculation  Start Time: 930  Stop Time:   Time Calculation (min): 45 min  PT Therapeutic Procedures Time Entry  Manual Therapy Time Entry: 10  Therapeutic Exercise Time Entry: 35           Visit Number: 5  Auth: Continue with current program  24V APPROVED 2025-2025  Current Problem  Problem List Items Addressed This Visit           ICD-10-CM    Instability of right shoulder joint M25.311     Other Visit Diagnoses         Codes      Glenoid labral tear, right, initial encounter     S43.431A             Subjective   Pt reports R shldr is sore.     Precautions   See protocol below.    Pain  Pain Assessment: 0-10  0-10 (Numeric) Pain Score: 1  Pain Type: Surgical pain  Pain Location: Shoulder  Pain Orientation: Right    Objective   AAROM  ROM in Degrees RIGHT   Shoulder Flexion 123   Shoulder Abduction 87   Shoulder ER arm at side 30       Outcome Measures:   Not today.    Treatments: DOS: 25  EXERCISE Date 2025   Date 5/15/2025  Date 25   Date 25    REPS REPS REPS 5 Weeks    Visit #2 #3 Visit #4 Visit #5                 Pulleys      Flexion to 90 deg 3 min 3 min 3 min 3 min    Pulleys      Scaption/Abduction to 45 deg 3 min 3 min 3 min 3 min   Pulleys      IR (25)                                                        Wand flexion supine (within precautions) 2x10 2x10 2x10 2 x 10   Wand abduction supine (within precautions) 2x10 2x10 2x10 2 x 10   Wand ER supine (within precautions) 2x10 2x10 2x10 2 x 10          UE RANGER flexion  Scaption 2x10  2x10 2x10  2x10 2x10  hold NT          Shoulder isometrics arm at side  FF/ER/IR/ABD/ADD (25)   2x10, 3 sec hold all 2x10, 3 sec hold all          Scapular protraction  (25)   10x 10x2 3\"H   Scapular retraction (25)   10x 10x2 3\"H          Manual PROM (within precautions) 15 min 20 min 10 " min 10min          Cold pack PRN    @ home          HEP: Access Code: [email or web address]  URL: [email or web address]  Date: 05/09/2025  Prepared by: Serjio Butler    Exercises  - Supine Shoulder Flexion Extension AAROM with Dowel  - 2-3 x daily - 5-7 x weekly - 3 sets - 10 reps - 3 sec hold  - Supine Shoulder Abduction AAROM with Dowel  - 2-3 x daily - 5-7 x weekly - 3 sets - 10 reps  - Supine Shoulder External Rotation with Dowel  - 2-3 x daily - 5-7 x weekly - 3 sets - 10 reps Reviewed      PHASE I (Weeks 2 - 6):   Sling: At all times, except for hygiene and exercises  Range of Motion:   Weeks 2-4: PROM and AAROM including FF to 90° and ER to neutral with arm at side, IR to stomach, 45° ABD  Weeks 4-6: PROM and AAROM including FF to 120°, ER to 20° with arm at side, ABD to 90°   NO combined ABD-ER   Exercise: 2 x 10  Weeks 2-4: Isometric exercises in sling  Week 4: Begin scapular stabilizers (protraction, retraction). Begin gentle isometrics with arm at side-FF/ER/IR/ABD/ADD  NO combined ABD-ER   Modalities: Per therapist, including electrical stimulation, ultrasound, heat (before), ice (after)     PHASE II (Weeks 6 - 12):   Sling: Discontinue at 6 weeks  Range of Motion: Begin AROM in all planes. 160° FF / 45° ER at side / 160° ABD / IR behind back to waist. No manipulations  Week 8+: Progress motion in all planes as tolerated  Exercise: Continue Phase I; posterior glides are okay (no anterior glides)   Weeks 6-8: Progress to resisted isometrics  Weeks 8-10: Progress to resisted exercises with therabands  Weeks 10-12: Progress to light weights (1-5 lbs); 8-12 reps/2-3 sets per rotator cuff, deltoid, and scapular stabilizers with low abduction angles  Modalities: Per therapist, including electrical stimulation, ultrasound, heat (before), ice (after)     PHASE III (Weeks 12 - 24):   Sling: None  Range of Motion: Full  Exercise: Continue Phase II, advance as tolerated.   Sport related conditioning  at 3 months  May return to weight room at 4 months   Begin sport specific exercises at 4 months  Return to throwing at 4.5 months  Throw from pitcher's mound at 6 months  Modalities: Per therapist, including electrical stimulation, ultrasound, heat (before), ice (after)   Consider return to competitive sports, including contact sports by 4-6 months, if approved            Assessment:   Issued isometrics for HEP. Patients shouulder ROM in line with current phase of protocol.  Patient reports 1 pain after treatment today.    Plan:   Continue with current program focusing on ROM and pain improvements within protocol restrictions.      Goals:  Active       PT Problem       PT Goal 1       Start:  05/09/25    Expected End:  08/09/25       Pt will increase R UE strength to 4+/5 or greater in order to improve tolerance to functional activities and safely return to work as a .          PT Goal 2       Start:  05/09/25    Expected End:  08/09/25       Pt will increase R shoulder ROM to WNL in order to reduce pain and improve tolerance to functional mobility and safely RTW as a .          PT Goal 3       Start:  05/09/25    Expected End:  08/09/25       Pt will decrease pain to 1/10 at worst, with pt able to manage symptoms with HEP, positioning and modalities independently.          PT Goal 4       Start:  05/09/25    Expected End:  08/09/25       Pt will be able to teach back 90% of HEP in order to maintain gains made in PT.

## 2025-05-28 ENCOUNTER — TREATMENT (OUTPATIENT)
Dept: PHYSICAL THERAPY | Facility: HOSPITAL | Age: 35
End: 2025-05-28
Payer: MEDICAID

## 2025-05-28 DIAGNOSIS — M25.311 INSTABILITY OF RIGHT SHOULDER JOINT: ICD-10-CM

## 2025-05-28 DIAGNOSIS — S43.431A GLENOID LABRAL TEAR, RIGHT, INITIAL ENCOUNTER: ICD-10-CM

## 2025-05-28 PROCEDURE — 97140 MANUAL THERAPY 1/> REGIONS: CPT | Mod: GP | Performed by: PHYSICAL THERAPIST

## 2025-05-28 PROCEDURE — 97110 THERAPEUTIC EXERCISES: CPT | Mod: GP | Performed by: PHYSICAL THERAPIST

## 2025-05-28 ASSESSMENT — PAIN - FUNCTIONAL ASSESSMENT: PAIN_FUNCTIONAL_ASSESSMENT: 0-10

## 2025-05-28 ASSESSMENT — PAIN SCALES - GENERAL: PAINLEVEL_OUTOF10: 0 - NO PAIN

## 2025-05-28 NOTE — PROGRESS NOTES
"Physical Therapy    Physical Therapy Treatment    Patient Name: Ruthann Nichole  MRN: [id number]  : 1990   Today's Date: 2025             Visit Number: 6  Auth: Continue with current program  24V APPROVED 2025-2025  Current Problem  Problem List Items Addressed This Visit           ICD-10-CM    Instability of right shoulder joint M25.311     Other Visit Diagnoses         Codes      Glenoid labral tear, right, initial encounter     S43.431A             Subjective   Pt reports continued compliance with HEP; she is excited to discontinue sling on Friday.    Precautions   See protocol below.    Pain  0/10   Right shoulder    Objective   AAROM  ROM in Degrees RIGHT   Shoulder Flexion 120   Shoulder Abduction 89   Shoulder ER arm at side 20       Outcome Measures:   Not today.    Treatments: DOS: 25  EXERCISE Date 25   Date 25 Date 2025      REPS 5 Weeks     Visit #4 Visit #5 Visit #6               Pulleys      Flexion to 90 deg 3 min 3 min  3 min   Pulleys      Scaption/Abduction to 45 deg 3 min 3 min 3 min   Pulleys      IR (25)                                                Wand flexion supine (within precautions) 2x10 2 x 10 2 x 10   Wand abduction supine (within precautions) 2x10 2 x 10 2 x 10   Wand ER supine (within precautions) 2x10 2 x 10 2 x 10         UE RANGER flexion  Scaption 2x10  hold NT 2x10  hold         Shoulder isometrics arm at side  FF/ER/IR/ABD/ADD (25) 2x10, 3 sec hold all 2x10, 3 sec hold all 2x10, 3 sec hold all         Scapular protraction  (25) 10x 10x2 3\"H 10x2 3\"H   Scapular retraction (25) 10x 10x2 3\"H 10x2 3\"H         Manual PROM (within precautions) 10 min 10min 20 min         Cold pack PRN  @ home @ home         HEP: Access Code: [email or web address]  URL: [email or web address]  Date: 2025  Prepared by: Emily Mendez    Exercises  - Supine Shoulder Flexion Extension AAROM with Dowel  - 2-3 x daily - 5-7 x weekly - 3 " sets - 10 reps - 3 sec hold  - Supine Shoulder Abduction AAROM with Dowel  - 2-3 x daily - 5-7 x weekly - 3 sets - 10 reps  - Supine Shoulder External Rotation with Dowel  - 2-3 x daily - 5-7 x weekly - 3 sets - 10 reps      PHASE I (Weeks 2 - 6):   Sling: At all times, except for hygiene and exercises  Range of Motion:   Weeks 2-4: PROM and AAROM including FF to 90° and ER to neutral with arm at side, IR to stomach, 45° ABD  Weeks 4-6: PROM and AAROM including FF to 120°, ER to 20° with arm at side, ABD to 90°   NO combined ABD-ER   Exercise: 2 x 10  Weeks 2-4: Isometric exercises in sling  Week 4: Begin scapular stabilizers (protraction, retraction). Begin gentle isometrics with arm at side-FF/ER/IR/ABD/ADD  NO combined ABD-ER   Modalities: Per therapist, including electrical stimulation, ultrasound, heat (before), ice (after)     PHASE II (Weeks 6 - 12):   Sling: Discontinue at 6 weeks  Range of Motion: Begin AROM in all planes. 160° FF / 45° ER at side / 160° ABD / IR behind back to waist. No manipulations  Week 8+: Progress motion in all planes as tolerated  Exercise: Continue Phase I; posterior glides are okay (no anterior glides)   Weeks 6-8: Progress to resisted isometrics  Weeks 8-10: Progress to resisted exercises with therabands  Weeks 10-12: Progress to light weights (1-5 lbs); 8-12 reps/2-3 sets per rotator cuff, deltoid, and scapular stabilizers with low abduction angles  Modalities: Per therapist, including electrical stimulation, ultrasound, heat (before), ice (after)     PHASE III (Weeks 12 - 24):   Sling: None  Range of Motion: Full  Exercise: Continue Phase II, advance as tolerated.   Sport related conditioning at 3 months  May return to weight room at 4 months   Begin sport specific exercises at 4 months  Return to throwing at 4.5 months  Throw from pitcher's mound at 6 months  Modalities: Per therapist, including electrical stimulation, ultrasound, heat (before), ice (after)   Consider return to  competitive sports, including contact sports by 4-6 months, if approved            Assessment:  Pt tolerated session without incidence and minimal pain, with pain described as stiffness at end ROM during manual.  AAROM/PROM within expected range at current phase.    Plan:   Continue with current program focusing on ROM and pain improvements within protocol restrictions.      Goals:  Active       PT Problem       PT Goal 1       Start:  05/09/25    Expected End:  08/09/25       Pt will increase R UE strength to 4+/5 or greater in order to improve tolerance to functional activities and safely return to work as a .          PT Goal 2       Start:  05/09/25    Expected End:  08/09/25       Pt will increase R shoulder ROM to WNL in order to reduce pain and improve tolerance to functional mobility and safely RTW as a .          PT Goal 3       Start:  05/09/25    Expected End:  08/09/25       Pt will decrease pain to 1/10 at worst, with pt able to manage symptoms with HEP, positioning and modalities independently.          PT Goal 4       Start:  05/09/25    Expected End:  08/09/25       Pt will be able to teach back 90% of HEP in order to maintain gains made in PT.

## 2025-05-30 ENCOUNTER — TREATMENT (OUTPATIENT)
Dept: PHYSICAL THERAPY | Facility: HOSPITAL | Age: 35
End: 2025-05-30
Payer: MEDICAID

## 2025-05-30 DIAGNOSIS — M25.311 INSTABILITY OF RIGHT SHOULDER JOINT: ICD-10-CM

## 2025-05-30 DIAGNOSIS — S43.431A GLENOID LABRAL TEAR, RIGHT, INITIAL ENCOUNTER: ICD-10-CM

## 2025-05-30 DIAGNOSIS — M25.611 DECREASED RANGE OF MOTION OF RIGHT SHOULDER: ICD-10-CM

## 2025-05-30 PROCEDURE — 97110 THERAPEUTIC EXERCISES: CPT | Mod: GP,CQ | Performed by: PHYSICAL THERAPY ASSISTANT

## 2025-05-30 PROCEDURE — 97140 MANUAL THERAPY 1/> REGIONS: CPT | Mod: GP,CQ | Performed by: PHYSICAL THERAPY ASSISTANT

## 2025-05-30 ASSESSMENT — PAIN SCALES - GENERAL: PAINLEVEL_OUTOF10: 0 - NO PAIN

## 2025-05-30 ASSESSMENT — PAIN - FUNCTIONAL ASSESSMENT: PAIN_FUNCTIONAL_ASSESSMENT: 0-10
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
## 2025-06-02 ENCOUNTER — TREATMENT (OUTPATIENT)
Dept: PHYSICAL THERAPY | Facility: HOSPITAL | Age: 35
End: 2025-06-02
Payer: MEDICAID

## 2025-06-02 DIAGNOSIS — M25.311 INSTABILITY OF RIGHT SHOULDER JOINT: ICD-10-CM

## 2025-06-02 DIAGNOSIS — S43.431A GLENOID LABRAL TEAR, RIGHT, INITIAL ENCOUNTER: ICD-10-CM

## 2025-06-02 PROCEDURE — 97110 THERAPEUTIC EXERCISES: CPT | Mod: GP | Performed by: PHYSICAL THERAPIST

## 2025-06-02 PROCEDURE — 97140 MANUAL THERAPY 1/> REGIONS: CPT | Mod: GP | Performed by: PHYSICAL THERAPIST

## 2025-06-02 ASSESSMENT — PAIN SCALES - GENERAL: PAINLEVEL_OUTOF10: 0 - NO PAIN
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
## 2025-06-04 ENCOUNTER — APPOINTMENT (OUTPATIENT)
Dept: ORTHOPEDIC SURGERY | Age: 35
End: 2025-06-04
Payer: MEDICAID

## 2025-06-04 DIAGNOSIS — S43.431D GLENOID LABRAL TEAR, RIGHT, SUBSEQUENT ENCOUNTER: Primary | ICD-10-CM

## 2025-06-04 PROCEDURE — 1036F TOBACCO NON-USER: CPT | Performed by: STUDENT IN AN ORGANIZED HEALTH CARE EDUCATION/TRAINING PROGRAM

## 2025-06-04 PROCEDURE — 99024 POSTOP FOLLOW-UP VISIT: CPT | Performed by: STUDENT IN AN ORGANIZED HEALTH CARE EDUCATION/TRAINING PROGRAM

## 2025-06-04 ASSESSMENT — PAIN - FUNCTIONAL ASSESSMENT: PAIN_FUNCTIONAL_ASSESSMENT: NO/DENIES PAIN

## 2025-06-05 ENCOUNTER — TREATMENT (OUTPATIENT)
Dept: PHYSICAL THERAPY | Facility: HOSPITAL | Age: 35
End: 2025-06-05
Payer: MEDICAID

## 2025-06-05 DIAGNOSIS — S43.431A GLENOID LABRAL TEAR, RIGHT, INITIAL ENCOUNTER: ICD-10-CM

## 2025-06-05 DIAGNOSIS — M25.311 INSTABILITY OF RIGHT SHOULDER JOINT: ICD-10-CM

## 2025-06-05 PROCEDURE — 97110 THERAPEUTIC EXERCISES: CPT | Mod: GP | Performed by: PHYSICAL THERAPIST

## 2025-06-05 PROCEDURE — 97140 MANUAL THERAPY 1/> REGIONS: CPT | Mod: GP | Performed by: PHYSICAL THERAPIST

## 2025-06-05 ASSESSMENT — PAIN SCALES - GENERAL: PAINLEVEL_OUTOF10: 1

## 2025-06-05 NOTE — PROGRESS NOTES
"Physical Therapy    Physical Therapy Treatment    Patient Name: Ruthann Nichole  MRN: [id number]  : 1990   Today's Date: 2025  Time Calculation  Start Time: 0900  Stop Time: 0945  Time Calculation (min): 45 min       PT Therapeutic Procedures Time Entry  Manual Therapy Time Entry: 15  Therapeutic Exercise Time Entry: 30      Visit #9    Assessment:   Highly encouraged a minimum of daily stretching to restore functional ROM, pt verbalizes understanding. She tolerates increased reps with AROM, verbal cues to avoid scap substitution.     Pt reports 0/10 pain after treatment.    Plan:   25 is 8 weeks when gentle PRE's can be added. Continue per protocol.     Current Problem  1. Glenoid labral tear, right, initial encounter  Follow Up In Physical Therapy      2. Instability of right shoulder joint  Follow Up In Physical Therapy          Subjective   General  Pt states she saw Dr. Ledbetter who states she can start doing light lifting as long as it's not overhead. She reports not full compliance with daily HEP. States she felt good after last visit. Sore today, maybe from sleeping on right side.    Precautions  Precautions  Precautions Comment: see protocol    Pain  0-10 (Numeric) Pain Score: 1  Pain Location: Shoulder  Pain Orientation: Right    Objective       Treatments:  EXERCISE Date 25 Date 25    WK 8 = 25  6 weeks      Visit #6 Visit #7 #8 #9                 Pulleys      Flexion to 90 deg 3 min 3 min 3' 3'   Pulleys      Scaption/Abduction to 45 deg 3 min 3 min 3' 3'   Pulleys      IR (25)  5\"H x 20  5\"x10 2'          Table slides:       FF   5\"x10 HEP - reviewed   Scap   5\"x10 HEP - reviewed          FF to 90 deg   2x10 2x10   SCAP to 90 deg   2x10 2x10   S/L ER  2 x 10 3x10 2x20   S/L abd to 90  2 x 10 3x10 2x20          AROM supine flex   X 10                    Wand flexion supine (within precautions) 2 x 10 2 x 10 HEP    Wand abduction supine (within precautions) 2 " "x 10 2 x 10 HEP    Wand ER supine (within precautions) 2 x 10 2 x 10 HEP           UE RANGER flexion  Scaption 2x10  hold             Shoulder isometrics arm at side  FF/ER/IR/ABD/ADD (5/16/25) 2x10, 3 sec hold all 1x10, 3 sec hold all HEP           Scapular protraction  (5/16/25) 10x2 3\"H HEP     Scapular retraction (5/16/25) 10x2 3\"H HEP            Manual PROM (within precautions), scap mobs 20 min 20 min 20' 15'          Cold pack PRN @ home @ home            HEP: Access Code: [email or web address]  URL: [email or web address]  Date: 05/09/2025  Prepared by: Emily Mendez    Exercises  - Supine Shoulder Flexion Extension AAROM with Dowel  - 2-3 x daily - 5-7 x weekly - 3 sets - 10 reps - 3 sec hold  - Supine Shoulder Abduction AAROM with Dowel  - 2-3 x daily - 5-7 x weekly - 3 sets - 10 reps  - Supine Shoulder External Rotation with Dowel  - 2-3 x daily - 5-7 x weekly - 3 sets - 10 reps    NEW 6/3/25:  Access Code: [email or web address]  Exercises  - Seated Shoulder Flexion Towel Slide at Table Top  - 2 x daily - 1-2 sets - 10 reps - 3-5 seconds hold  - Seated Shoulder Abduction Towel Slide at Table Top  - 2 x daily - 1-2 sets - 10 reps - 3-5 seconds hold  - Standing Shoulder Internal Rotation Stretch with Dowel (Mirrored)  - 2 x daily - 1-2 sets - 10 reps - 3-5 seconds hold       PHASE I (Weeks 2 - 6):   Sling: At all times, except for hygiene and exercises  Range of Motion:   Weeks 2-4: PROM and AAROM including FF to 90° and ER to neutral with arm at side, IR to stomach, 45° ABD  Weeks 4-6: PROM and AAROM including FF to 120°, ER to 20° with arm at side, ABD to 90°   NO combined ABD-ER   Exercise: 2 x 10  Weeks 2-4: Isometric exercises in sling  Week 4: Begin scapular stabilizers (protraction, retraction). Begin gentle isometrics with arm at side-FF/ER/IR/ABD/ADD  NO combined ABD-ER   Modalities: Per therapist, including electrical stimulation, ultrasound, heat (before), ice (after)     PHASE II (Weeks 6 - 12): "   Sling: Discontinue at 6 weeks  Range of Motion: Begin AROM in all planes. 160° FF / 45° ER at side / 160° ABD / IR behind back to waist. No manipulations  Week 8+: Progress motion in all planes as tolerated  Exercise: Continue Phase I; posterior glides are okay (no anterior glides)   Weeks 6-8: Progress to resisted isometrics  Weeks 8-10: Progress to resisted exercises with therabands  Weeks 10-12: Progress to light weights (1-5 lbs); 8-12 reps/2-3 sets per rotator cuff, deltoid, and scapular stabilizers with low abduction angles  Modalities: Per therapist, including electrical stimulation, ultrasound, heat (before), ice (after)     PHASE III (Weeks 12 - 24):   Sling: None  Range of Motion: Full  Exercise: Continue Phase II, advance as tolerated.   Sport related conditioning at 3 months  May return to weight room at 4 months   Begin sport specific exercises at 4 months  Return to throwing at 4.5 months  Throw from pitcher's mound at 6 months  Modalities: Per therapist, including electrical stimulation, ultrasound, heat (before), ice (after)   Consider return to competitive sports, including contact sports by 4-6 months, if approved  Goals:  Active       PT Problem       PT Goal 1       Start:  05/09/25    Expected End:  08/09/25       Pt will increase R UE strength to 4+/5 or greater in order to improve tolerance to functional activities and safely return to work as a .          PT Goal 2 (Progressing)       Start:  05/09/25    Expected End:  08/09/25       Pt will increase R shoulder ROM to WNL in order to reduce pain and improve tolerance to functional mobility and safely RTW as a .          PT Goal 3 (Progressing)       Start:  05/09/25    Expected End:  08/09/25       Pt will decrease pain to 1/10 at worst, with pt able to manage symptoms with HEP, positioning and modalities independently.          PT Goal 4 (Progressing)       Start:  05/09/25    Expected End:  08/09/25       Pt will  be able to teach back 90% of HEP in order to maintain gains made in PT.

## 2025-06-09 ENCOUNTER — TREATMENT (OUTPATIENT)
Dept: PHYSICAL THERAPY | Facility: HOSPITAL | Age: 35
End: 2025-06-09
Payer: MEDICAID

## 2025-06-09 DIAGNOSIS — M25.311 INSTABILITY OF RIGHT SHOULDER JOINT: ICD-10-CM

## 2025-06-09 DIAGNOSIS — M25.611 DECREASED RANGE OF MOTION OF RIGHT SHOULDER: ICD-10-CM

## 2025-06-09 DIAGNOSIS — S43.431A GLENOID LABRAL TEAR, RIGHT, INITIAL ENCOUNTER: ICD-10-CM

## 2025-06-09 PROCEDURE — 97140 MANUAL THERAPY 1/> REGIONS: CPT | Mod: GP,CQ | Performed by: PHYSICAL THERAPY ASSISTANT

## 2025-06-09 PROCEDURE — 97110 THERAPEUTIC EXERCISES: CPT | Mod: GP,CQ | Performed by: PHYSICAL THERAPY ASSISTANT

## 2025-06-09 ASSESSMENT — PAIN - FUNCTIONAL ASSESSMENT: PAIN_FUNCTIONAL_ASSESSMENT: 0-10

## 2025-06-09 ASSESSMENT — PAIN SCALES - GENERAL: PAINLEVEL_OUTOF10: 0 - NO PAIN

## 2025-06-09 NOTE — PROGRESS NOTES
"Physical Therapy    Physical Therapy Treatment    Patient Name: Ruthann Nichole  MRN: [id number]  : 1990   Today's Date: 2025  Time Calculation  Start Time: 930  Stop Time:   Time Calculation (min): 45 min       PT Therapeutic Procedures Time Entry  Manual Therapy Time Entry: 15  Therapeutic Exercise Time Entry: 30      Visit #10    Assessment:  Patient completed initial strengthening ex's without difficulty or pain. Patient continues to require verbal cues with AROM to avoid scap substitution. Slight improvement in AAROM.   Pt reports 0/10 pain after treatment.    Plan:   25 is 8 weeks when gentle PRE's can be added. Continue per protocol.     Current Problem  1. Decreased range of motion of right shoulder        2. Glenoid labral tear, right, initial encounter  Follow Up In Physical Therapy      3. Instability of right shoulder joint  Follow Up In Physical Therapy          Subjective   General  Pt states she has a stiff neck on R side. Reports no pain in Right shldr and improved compliance with HEP.    Precautions  Precautions  Precautions Comment: see protocol    Pain  Pain Assessment: 0-10  0-10 (Numeric) Pain Score: 0 - No pain  Pain Location: Shoulder  Pain Orientation: Right    Objective   AAROM Right shldr:  Flex 134  Abd 104  ER 40    Treatments:  EXERCISE Date 25 Date 25   WK 8 = 25  6 weeks   8 weeks    Visit #6 Visit #7 #8 #9 #10                   Pulleys      Flexion to 90 deg 3 min 3 min 3' 3' 3'   Pulleys      Scaption/Abduction to 45 deg 3 min 3 min 3' 3' 3'   Pulleys      IR (25)  5\"H x 20  5\"x10 2' 2'           FF to 90 deg   2x10 2x10 2x10   SCAP to 90 deg   2x10 2x10 2x10   S/L ER  2 x 10 3x10 2x20 2x20   S/L abd to 90  2 x 10 3x10 2x20 2x20   Serratus punch      2 x 10   Tband Pull downs     Yellow 2 x 10   Tband Mid rows     Yellow 2 x 10           Manual PROM (within precautions), scap mobs 20 min 20 min 20' 15' 15'           Cold pack " PRN @ home @ home              HEP: Access Code: [email or web address]  URL: [email or web address]  Date: 05/09/2025  Prepared by: Emily Mendez    Exercises  - Supine Shoulder Flexion Extension AAROM with Dowel  - 2-3 x daily - 5-7 x weekly - 3 sets - 10 reps - 3 sec hold  - Supine Shoulder Abduction AAROM with Dowel  - 2-3 x daily - 5-7 x weekly - 3 sets - 10 reps  - Supine Shoulder External Rotation with Dowel  - 2-3 x daily - 5-7 x weekly - 3 sets - 10 reps    NEW 6/3/25:  Access Code: [email or web address]  Exercises  - Seated Shoulder Flexion Towel Slide at Table Top  - 2 x daily - 1-2 sets - 10 reps - 3-5 seconds hold  - Seated Shoulder Abduction Towel Slide at Table Top  - 2 x daily - 1-2 sets - 10 reps - 3-5 seconds hold  - Standing Shoulder Internal Rotation Stretch with Dowel (Mirrored)  - 2 x daily - 1-2 sets - 10 reps - 3-5 seconds hold        PHASE I (Weeks 2 - 6):   Sling: At all times, except for hygiene and exercises  Range of Motion:   Weeks 2-4: PROM and AAROM including FF to 90° and ER to neutral with arm at side, IR to stomach, 45° ABD  Weeks 4-6: PROM and AAROM including FF to 120°, ER to 20° with arm at side, ABD to 90°   NO combined ABD-ER   Exercise: 2 x 10  Weeks 2-4: Isometric exercises in sling  Week 4: Begin scapular stabilizers (protraction, retraction). Begin gentle isometrics with arm at side-FF/ER/IR/ABD/ADD  NO combined ABD-ER   Modalities: Per therapist, including electrical stimulation, ultrasound, heat (before), ice (after)     PHASE II (Weeks 6 - 12):   Sling: Discontinue at 6 weeks  Range of Motion: Begin AROM in all planes. 160° FF / 45° ER at side / 160° ABD / IR behind back to waist. No manipulations  Week 8+: Progress motion in all planes as tolerated  Exercise: Continue Phase I; posterior glides are okay (no anterior glides)   Weeks 6-8: Progress to resisted isometrics  Weeks 8-10: Progress to resisted exercises with therabands  Weeks 10-12: Progress to light weights (1-5  lbs); 8-12 reps/2-3 sets per rotator cuff, deltoid, and scapular stabilizers with low abduction angles  Modalities: Per therapist, including electrical stimulation, ultrasound, heat (before), ice (after)     PHASE III (Weeks 12 - 24):   Sling: None  Range of Motion: Full  Exercise: Continue Phase II, advance as tolerated.   Sport related conditioning at 3 months  May return to weight room at 4 months   Begin sport specific exercises at 4 months  Return to throwing at 4.5 months  Throw from pitcher's mound at 6 months  Modalities: Per therapist, including electrical stimulation, ultrasound, heat (before), ice (after)   Consider return to competitive sports, including contact sports by 4-6 months, if approved  Goals:  Active       PT Problem       PT Goal 1       Start:  05/09/25    Expected End:  08/09/25       Pt will increase R UE strength to 4+/5 or greater in order to improve tolerance to functional activities and safely return to work as a .          PT Goal 2 (Progressing)       Start:  05/09/25    Expected End:  08/09/25       Pt will increase R shoulder ROM to WNL in order to reduce pain and improve tolerance to functional mobility and safely RTW as a .          PT Goal 3 (Progressing)       Start:  05/09/25    Expected End:  08/09/25       Pt will decrease pain to 1/10 at worst, with pt able to manage symptoms with HEP, positioning and modalities independently.          PT Goal 4 (Progressing)       Start:  05/09/25    Expected End:  08/09/25       Pt will be able to teach back 90% of HEP in order to maintain gains made in PT.

## 2025-06-12 ENCOUNTER — TREATMENT (OUTPATIENT)
Dept: PHYSICAL THERAPY | Facility: HOSPITAL | Age: 35
End: 2025-06-12
Payer: MEDICAID

## 2025-06-12 DIAGNOSIS — S43.431A GLENOID LABRAL TEAR, RIGHT, INITIAL ENCOUNTER: ICD-10-CM

## 2025-06-12 DIAGNOSIS — M25.311 INSTABILITY OF RIGHT SHOULDER JOINT: ICD-10-CM

## 2025-06-12 DIAGNOSIS — M25.611 DECREASED RANGE OF MOTION OF RIGHT SHOULDER: ICD-10-CM

## 2025-06-12 PROCEDURE — 97110 THERAPEUTIC EXERCISES: CPT | Mod: GP,CQ | Performed by: PHYSICAL THERAPY ASSISTANT

## 2025-06-12 PROCEDURE — 97140 MANUAL THERAPY 1/> REGIONS: CPT | Mod: GP,CQ | Performed by: PHYSICAL THERAPY ASSISTANT

## 2025-06-12 ASSESSMENT — PAIN SCALES - GENERAL: PAINLEVEL_OUTOF10: 2

## 2025-06-12 ASSESSMENT — PAIN - FUNCTIONAL ASSESSMENT: PAIN_FUNCTIONAL_ASSESSMENT: 0-10

## 2025-06-12 NOTE — PROGRESS NOTES
Physical Therapy    Physical Therapy Treatment    Patient Name: Ruthann Nichole  MRN: [id number]  : 1990   Today's Date: 2025  Time Calculation  Start Time: 1110  Stop Time: 1202  Time Calculation (min): 52 min       PT Therapeutic Procedures Time Entry  Therapeutic Exercise Time Entry: 32  Manual Therapy Time Entry: 20      Visit #11  24V APPROVED 2025-2025  Assessment:  Patient completed initial strengthening ex's without difficulty or pain. Patient continues to require verbal cues with AROM to avoid scap substitution. Improvement in AAROM since last recorded measure.  Plan:  25 is 8 weeks when gentle PRE's can be added. Continue per protocol.   OP PT Plan  Treatment/Interventions: Cryotherapy, Education/ Instruction, Manual therapy, Therapeutic activities, Therapeutic exercises, Ultrasound  PT Plan: Skilled PT  PT Frequency: 2 times per week  Duration: 12 weeks  Onset Date: 25  Certification Period Start Date: 25  Certification Period End Date: 25  Rehab Potential: Good  Plan of Care Agreement: Patient    Current Problem  1. Decreased range of motion of right shoulder        2. Glenoid labral tear, right, initial encounter  Follow Up In Physical Therapy      3. Instability of right shoulder joint  Follow Up In Physical Therapy          Subjective   General  Pt states she feels some pain in anterior shoulder after ex and with sleeping, ceases when activity stops. Reports compliance with HEP.   Precautions  Precautions  Precautions Comment: see protocol    Pain  Pain Assessment: 0-10  0-10 (Numeric) Pain Score: 2  Pain Location: Shoulder  Pain Orientation: Right    Objective   AAROM Right shldr:  Flex 146  Abd 124  ER 40    Treatments:  DOS 25  EXERCISE Date 25 Date 25   WK 8 = 25  6 weeks    8 weeks    Visit #6 Visit #7 #8 #9 #10 #11                     Pulleys      Flexion to 90 deg 3 min 3 min 3' 3' 3' 3'   Pulleys       "Scaption/Abduction to 45 deg 3 min 3 min 3' 3' 3' 3'   Pulleys      IR (5/30/25)  5\"H x 20  5\"x10 2' 2' 2'            FF to 90 deg   2x10 2x10 2x10 2x10   SCAP to 90 deg   2x10 2x10 2x10 2x10   S/L ER  2 x 10 3x10 2x20 2x20 2x20   S/L abd to 90  2 x 10 3x10 2x20 2x20 2x20   Serratus punch      2 x 10 2x10   Tband Pull downs     Yellow 2 x 10 Yellow 2 x 10   Tband Mid rows     Yellow 2 x 10 Yellow 2 x 10            Manual PROM (within precautions), scap mobs 20 min 20 min 20' 15' 15' 20'            Cold pack PRN @ home @ home                HEP: Access Code: [email or web address]  URL: [email or web address]  Date: 05/09/2025  Prepared by: Emily Mendez    Exercises  - Supine Shoulder Flexion Extension AAROM with Dowel  - 2-3 x daily - 5-7 x weekly - 3 sets - 10 reps - 3 sec hold  - Supine Shoulder Abduction AAROM with Dowel  - 2-3 x daily - 5-7 x weekly - 3 sets - 10 reps  - Supine Shoulder External Rotation with Dowel  - 2-3 x daily - 5-7 x weekly - 3 sets - 10 reps    NEW 6/3/25:  Access Code: [email or web address]  Exercises  - Seated Shoulder Flexion Towel Slide at Table Top  - 2 x daily - 1-2 sets - 10 reps - 3-5 seconds hold  - Seated Shoulder Abduction Towel Slide at Table Top  - 2 x daily - 1-2 sets - 10 reps - 3-5 seconds hold  - Standing Shoulder Internal Rotation Stretch with Dowel (Mirrored)  - 2 x daily - 1-2 sets - 10 reps - 3-5 seconds hold         PHASE I (Weeks 2 - 6):   Sling: At all times, except for hygiene and exercises  Range of Motion:   Weeks 2-4: PROM and AAROM including FF to 90° and ER to neutral with arm at side, IR to stomach, 45° ABD  Weeks 4-6: PROM and AAROM including FF to 120°, ER to 20° with arm at side, ABD to 90°   NO combined ABD-ER   Exercise: 2 x 10  Weeks 2-4: Isometric exercises in sling  Week 4: Begin scapular stabilizers (protraction, retraction). Begin gentle isometrics with arm at side-FF/ER/IR/ABD/ADD  NO combined ABD-ER   Modalities: Per therapist, including electrical " stimulation, ultrasound, heat (before), ice (after)     PHASE II (Weeks 6 - 12):   Sling: Discontinue at 6 weeks  Range of Motion: Begin AROM in all planes. 160° FF / 45° ER at side / 160° ABD / IR behind back to waist. No manipulations  Week 8+: Progress motion in all planes as tolerated  Exercise: Continue Phase I; posterior glides are okay (no anterior glides)   Weeks 6-8: Progress to resisted isometrics  Weeks 8-10: Progress to resisted exercises with therabands  Weeks 10-12: Progress to light weights (1-5 lbs); 8-12 reps/2-3 sets per rotator cuff, deltoid, and scapular stabilizers with low abduction angles  Modalities: Per therapist, including electrical stimulation, ultrasound, heat (before), ice (after)     PHASE III (Weeks 12 - 24):   Sling: None  Range of Motion: Full  Exercise: Continue Phase II, advance as tolerated.   Sport related conditioning at 3 months  May return to weight room at 4 months   Begin sport specific exercises at 4 months  Return to throwing at 4.5 months  Throw from pitcher's mound at 6 months  Modalities: Per therapist, including electrical stimulation, ultrasound, heat (before), ice (after)   Consider return to competitive sports, including contact sports by 4-6 months, if approved  Goals:  Active       PT Problem       PT Goal 1       Start:  05/09/25    Expected End:  08/09/25       Pt will increase R UE strength to 4+/5 or greater in order to improve tolerance to functional activities and safely return to work as a .          PT Goal 2 (Progressing)       Start:  05/09/25    Expected End:  08/09/25       Pt will increase R shoulder ROM to WNL in order to reduce pain and improve tolerance to functional mobility and safely RTW as a .          PT Goal 3 (Progressing)       Start:  05/09/25    Expected End:  08/09/25       Pt will decrease pain to 1/10 at worst, with pt able to manage symptoms with HEP, positioning and modalities independently.          PT  Goal 4 (Progressing)       Start:  05/09/25    Expected End:  08/09/25       Pt will be able to teach back 90% of HEP in order to maintain gains made in PT.

## 2025-06-17 ENCOUNTER — TREATMENT (OUTPATIENT)
Dept: PHYSICAL THERAPY | Facility: HOSPITAL | Age: 35
End: 2025-06-17
Payer: MEDICAID

## 2025-06-17 DIAGNOSIS — M25.611 DECREASED RANGE OF MOTION OF RIGHT SHOULDER: ICD-10-CM

## 2025-06-17 DIAGNOSIS — M25.311 INSTABILITY OF RIGHT SHOULDER JOINT: ICD-10-CM

## 2025-06-17 DIAGNOSIS — S43.431A GLENOID LABRAL TEAR, RIGHT, INITIAL ENCOUNTER: ICD-10-CM

## 2025-06-17 PROCEDURE — 97110 THERAPEUTIC EXERCISES: CPT | Mod: GP,CQ

## 2025-06-17 PROCEDURE — 97140 MANUAL THERAPY 1/> REGIONS: CPT | Mod: GP,CQ

## 2025-06-17 ASSESSMENT — PAIN - FUNCTIONAL ASSESSMENT: PAIN_FUNCTIONAL_ASSESSMENT: 0-10

## 2025-06-17 ASSESSMENT — PAIN SCALES - GENERAL: PAINLEVEL_OUTOF10: 0 - NO PAIN

## 2025-06-17 NOTE — PROGRESS NOTES
"Physical Therapy    Physical Therapy Treatment    Patient Name: Ruthann Nichole  MRN: [id number]  : 1990  Today's Date: 2025  Time Calculation  Start Time: 1100  Stop Time: 1145  Time Calculation (min): 45 min    PT Therapeutic Procedures Time Entry  Therapeutic Exercise Time Entry: 25  Manual Therapy Time Entry: 20          VISIT:# 12    Current Problem  Problem List Items Addressed This Visit           ICD-10-CM    Instability of right shoulder joint M25.311    Decreased range of motion of right shoulder M25.611     Other Visit Diagnoses         Codes      Glenoid labral tear, right, initial encounter     S43.431A             Subjective    Pt arrives today stating that she has no pain or discomfort in the R shoulder. She says that she sometimes has some soreness at home when performing her HEP but overall she is relatively pain free. She has had no recent falls. She continues to have compliance with her HEP.     Pain  Pain Assessment: 0-10  0-10 (Numeric) Pain Score: 0 - No pain  Pain Location: Shoulder  Pain Orientation: Right       Objective    R shoulder AROM   FF: 143*   Abd: 121*       Precautions  Precautions  Precautions Comment: see protocol      Treatments:  DOS 25  EXERCISE Date 25    WK 8 = 25 6 weeks    8 weeks     Visit #7 #8 #9 #10 #11 #12                     Pulleys      Flexion to 90 deg 3 min 3' 3' 3' 3' 3 min   Pulleys      Scaption/Abduction to 45 deg 3 min 3' 3' 3' 3' 3 min   Pulleys      IR (25) 5\"H x 20  5\"x10 2' 2' 2' 3 min            FF to 90 deg  2x10 2x10 2x10 2x10 2x10   SCAP to 90 deg  2x10 2x10 2x10 2x10 2x10   S/L ER 2 x 10 3x10 2x20 2x20 2x20 2x20   S/L abd to 90 2 x 10 3x10 2x20 2x20 2x20 2x20   Serratus punch     2 x 10 2x10 2x20   Tband Pull downs    Yellow 2 x 10 Yellow 2 x 10 Red 2x10   Tband Mid rows    Yellow 2 x 10 Yellow 2 x 10 Red 2x10            Manual PROM (within precautions), scap mobs 20 min 20' 15' " 15' 20' 20 min            Cold pack PRN @ home     @ home            HEP: Access Code: [email or web address]  URL: [email or web address]  Date: 05/09/2025  Prepared by: Emily Mnedez    Exercises  - Supine Shoulder Flexion Extension AAROM with Dowel  - 2-3 x daily - 5-7 x weekly - 3 sets - 10 reps - 3 sec hold  - Supine Shoulder Abduction AAROM with Dowel  - 2-3 x daily - 5-7 x weekly - 3 sets - 10 reps  - Supine Shoulder External Rotation with Dowel  - 2-3 x daily - 5-7 x weekly - 3 sets - 10 reps    NEW 6/3/25:  Access Code: [email or web address]  Exercises  - Seated Shoulder Flexion Towel Slide at Table Top  - 2 x daily - 1-2 sets - 10 reps - 3-5 seconds hold  - Seated Shoulder Abduction Towel Slide at Table Top  - 2 x daily - 1-2 sets - 10 reps - 3-5 seconds hold  - Standing Shoulder Internal Rotation Stretch with Dowel (Mirrored)  - 2 x daily - 1-2 sets - 10 reps - 3-5 seconds hold         PHASE I (Weeks 2 - 6):   Sling: At all times, except for hygiene and exercises  Range of Motion:   Weeks 2-4: PROM and AAROM including FF to 90° and ER to neutral with arm at side, IR to stomach, 45° ABD  Weeks 4-6: PROM and AAROM including FF to 120°, ER to 20° with arm at side, ABD to 90°   NO combined ABD-ER   Exercise: 2 x 10  Weeks 2-4: Isometric exercises in sling  Week 4: Begin scapular stabilizers (protraction, retraction). Begin gentle isometrics with arm at side-FF/ER/IR/ABD/ADD  NO combined ABD-ER   Modalities: Per therapist, including electrical stimulation, ultrasound, heat (before), ice (after)     PHASE II (Weeks 6 - 12):   Sling: Discontinue at 6 weeks  Range of Motion: Begin AROM in all planes. 160° FF / 45° ER at side / 160° ABD / IR behind back to waist. No manipulations  Week 8+: Progress motion in all planes as tolerated  Exercise: Continue Phase I; posterior glides are okay (no anterior glides)   Weeks 6-8: Progress to resisted isometrics  Weeks 8-10: Progress to resisted exercises with therabands  Weeks  10-12: Progress to light weights (1-5 lbs); 8-12 reps/2-3 sets per rotator cuff, deltoid, and scapular stabilizers with low abduction angles  Modalities: Per therapist, including electrical stimulation, ultrasound, heat (before), ice (after)     PHASE III (Weeks 12 - 24):   Sling: None  Range of Motion: Full  Exercise: Continue Phase II, advance as tolerated.   Sport related conditioning at 3 months  May return to weight room at 4 months   Begin sport specific exercises at 4 months  Return to throwing at 4.5 months  Throw from pitcher's mound at 6 months  Modalities: Per therapist, including electrical stimulation, ultrasound, heat (before), ice (after)   Consider return to competitive sports, including contact sports by 4-6 months, if approved    Assessment:   At the end of the session pt reports no pain in her R shoulder. She did have some slight pain when performing FF PROM but pain quickly dissipated with rest in between. She tolerated all exercises well and completed them with minimal difficulty.       Plan:   OP PT Plan  Treatment/Interventions: Cryotherapy, Education/ Instruction, Manual therapy, Therapeutic activities, Therapeutic exercises, Ultrasound  PT Plan: Skilled PT  PT Frequency: 2 times per week  Duration: 12 weeks  Onset Date: 04/18/25  Certification Period Start Date: 05/09/25  Certification Period End Date: 08/09/25  Rehab Potential: Good  Plan of Care Agreement: Patient    Goals:  Active       PT Problem       PT Goal 1       Start:  05/09/25    Expected End:  08/09/25       Pt will increase R UE strength to 4+/5 or greater in order to improve tolerance to functional activities and safely return to work as a .          PT Goal 2 (Progressing)       Start:  05/09/25    Expected End:  08/09/25       Pt will increase R shoulder ROM to WNL in order to reduce pain and improve tolerance to functional mobility and safely RTW as a .          PT Goal 3 (Progressing)       Start:   05/09/25    Expected End:  08/09/25       Pt will decrease pain to 1/10 at worst, with pt able to manage symptoms with HEP, positioning and modalities independently.          PT Goal 4 (Progressing)       Start:  05/09/25    Expected End:  08/09/25       Pt will be able to teach back 90% of HEP in order to maintain gains made in PT.

## 2025-06-19 ENCOUNTER — TREATMENT (OUTPATIENT)
Dept: PHYSICAL THERAPY | Facility: HOSPITAL | Age: 35
End: 2025-06-19
Payer: MEDICAID

## 2025-06-19 ENCOUNTER — APPOINTMENT (OUTPATIENT)
Dept: OBSTETRICS AND GYNECOLOGY | Facility: CLINIC | Age: 35
End: 2025-06-19
Payer: MEDICAID

## 2025-06-19 DIAGNOSIS — M25.311 INSTABILITY OF RIGHT SHOULDER JOINT: ICD-10-CM

## 2025-06-19 DIAGNOSIS — S43.431A GLENOID LABRAL TEAR, RIGHT, INITIAL ENCOUNTER: ICD-10-CM

## 2025-06-19 DIAGNOSIS — M25.611 DECREASED RANGE OF MOTION OF RIGHT SHOULDER: ICD-10-CM

## 2025-06-19 PROCEDURE — 97110 THERAPEUTIC EXERCISES: CPT | Mod: GP | Performed by: PHYSICAL THERAPIST

## 2025-06-19 PROCEDURE — 97140 MANUAL THERAPY 1/> REGIONS: CPT | Mod: GP | Performed by: PHYSICAL THERAPIST

## 2025-06-19 ASSESSMENT — PAIN SCALES - GENERAL: PAINLEVEL_OUTOF10: 0 - NO PAIN

## 2025-06-19 NOTE — PROGRESS NOTES
Physical Therapy    Physical Therapy Treatment    Patient Name: Ruthann Nichole  MRN: [id number]  : 1990   Today's Date: 2025  Time Calculation  Start Time: 935  Stop Time:   Time Calculation (min): 45 min       PT Therapeutic Procedures Time Entry  Therapeutic Exercise Time Entry: 25  Manual Therapy Time Entry: 20                   Visit #13    Assessment:   Added rotator cuff strengthening with T-band which pt reports as being easy. Advanced ROM exercises with HEP with pt demonstrating understanding.     Pt reports 0/10 pain after treatment.    Plan: Review new HEP. Progress T-band reps and add to HEP.  OP PT Plan  Treatment/Interventions: Cryotherapy, Education/ Instruction, Manual therapy, Therapeutic activities, Therapeutic exercises, Ultrasound  PT Plan: Skilled PT  PT Frequency: 2 times per week  Duration: 12 weeks  Onset Date: 25  Certification Period Start Date: 25  Certification Period End Date: 25  Rehab Potential: Good  Plan of Care Agreement: Patient    Current Problem  1. Decreased range of motion of right shoulder        2. Glenoid labral tear, right, initial encounter  Follow Up In Physical Therapy      3. Instability of right shoulder joint  Follow Up In Physical Therapy          Subjective   General  Pt states she has been seeing ROM improvements, continues to work at home on HEP. 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     Precautions  Precautions  Precautions Comment: see protocol    Pain  0-10 (Numeric) Pain Score: 0 - No pain    Objective       Treatments:  DOS 25  EXERCISE 25    WK 8 = 25  8 weeks      #10 #11 #12 #13                 Pulleys      Flexion to 90 deg 3' 3' 3 min 3'   Pulleys      Scaption/Abduction to 45 deg 3' 3' 3 min 3'   Pulleys      IR (25) 2' 2' 3 min 3'          FF to 90 deg 2x10 2x10 2x10 HEP   SCAP to 90 deg 2x10 2x10 2x10 HEP   S/L ER 2x20 2x20 2x20    S/L abd to 90 2x20 2x20 2x20    Serratus punch   2 x 10 2x10 2x20           Tband Pull downs Yellow 2 x 10 Yellow 2 x 10 Red 2x10 Green 2x15   Tband Mid rows Yellow 2 x 10 Yellow 2 x 10 Red 2x10 Green 2x15   Tband ER    Red 3x10   Tband IR    Red 3x10          Manual PROM (within precautions), scap mobs 15' 20' 20 min 20'          Cold pack PRN   @ home           HEP: Access Code: [email or web address]  URL: [email or web address]  Date: 05/09/2025  Prepared by: Emily Mendez    Exercises  - Supine Shoulder Flexion Extension AAROM with Dowel  - 2-3 x daily - 5-7 x weekly - 3 sets - 10 reps - 3 sec hold  - Supine Shoulder Abduction AAROM with Dowel  - 2-3 x daily - 5-7 x weekly - 3 sets - 10 reps  - Supine Shoulder External Rotation with Dowel  - 2-3 x daily - 5-7 x weekly - 3 sets - 10 reps    NEW 6/3/25:  Access Code: [email or web address]  Exercises  - Seated Shoulder Flexion Towel Slide at Table Top  - 2 x daily - 1-2 sets - 10 reps - 3-5 seconds hold  - Seated Shoulder Abduction Towel Slide at Table Top  - 2 x daily - 1-2 sets - 10 reps - 3-5 seconds hold  - Standing Shoulder Internal Rotation Stretch with Dowel (Mirrored)  - 2 x daily - 1-2 sets - 10 reps - 3-5 seconds hold    NEW 6/19/25:  Access Code: [email or web address]  Exercises  - Standing Shoulder Flexion Wall Walk  - 2 x daily - 10 reps - 5-10 hold  - Standing Shoulder Abduction Slides at Wall  - 2 x daily - 10 reps - 5-10 seconds hold  - Standing Shoulder External Rotation Stretch in Doorway  - 2 x daily - 10 reps - 5-10 seconds hold  - Standing Shoulder Internal Rotation Stretch with Dowel (Mirrored)  - 2 x daily - 2 sets - 10 reps - 5-10 seconds hold  AROM: FF, SCAP, ABD       PHASE II (Weeks 6 - 12):   Sling: Discontinue at 6 weeks  Range of Motion: Begin AROM in all planes. 160° FF / 45° ER at side / 160° ABD / IR behind back to waist. No manipulations  Week 8+: Progress motion in all planes as tolerated  Exercise: Continue Phase I; posterior glides are okay (no anterior glides)   Weeks 6-8: Progress to  resisted isometrics  Weeks 8-10: Progress to resisted exercises with therabands  Weeks 10-12: Progress to light weights (1-5 lbs); 8-12 reps/2-3 sets per rotator cuff, deltoid, and scapular stabilizers with low abduction angles  Modalities: Per therapist, including electrical stimulation, ultrasound, heat (before), ice (after)   Goals:  Active       PT Problem       PT Goal 1       Start:  05/09/25    Expected End:  08/09/25       Pt will increase R UE strength to 4+/5 or greater in order to improve tolerance to functional activities and safely return to work as a .          PT Goal 2 (Progressing)       Start:  05/09/25    Expected End:  08/09/25       Pt will increase R shoulder ROM to WNL in order to reduce pain and improve tolerance to functional mobility and safely RTW as a .          PT Goal 3 (Progressing)       Start:  05/09/25    Expected End:  08/09/25       Pt will decrease pain to 1/10 at worst, with pt able to manage symptoms with HEP, positioning and modalities independently.          PT Goal 4 (Progressing)       Start:  05/09/25    Expected End:  08/09/25       Pt will be able to teach back 90% of HEP in order to maintain gains made in PT.

## 2025-06-23 ENCOUNTER — TREATMENT (OUTPATIENT)
Dept: PHYSICAL THERAPY | Facility: HOSPITAL | Age: 35
End: 2025-06-23
Payer: MEDICAID

## 2025-06-23 DIAGNOSIS — S43.431A GLENOID LABRAL TEAR, RIGHT, INITIAL ENCOUNTER: ICD-10-CM

## 2025-06-23 DIAGNOSIS — M25.311 INSTABILITY OF RIGHT SHOULDER JOINT: ICD-10-CM

## 2025-06-23 DIAGNOSIS — M25.611 DECREASED RANGE OF MOTION OF RIGHT SHOULDER: ICD-10-CM

## 2025-06-23 PROCEDURE — 97140 MANUAL THERAPY 1/> REGIONS: CPT | Mod: GP,CQ

## 2025-06-23 PROCEDURE — 97110 THERAPEUTIC EXERCISES: CPT | Mod: GP,CQ

## 2025-06-23 ASSESSMENT — PAIN SCALES - GENERAL: PAINLEVEL_OUTOF10: 0 - NO PAIN

## 2025-06-23 ASSESSMENT — PAIN - FUNCTIONAL ASSESSMENT: PAIN_FUNCTIONAL_ASSESSMENT: 0-10

## 2025-06-23 NOTE — PROGRESS NOTES
"Physical Therapy    Physical Therapy Treatment    Patient Name: Ruthann Nichole  MRN: [id number]  : 1990  Today's Date: 2025  Time Calculation  Start Time: 945  Stop Time: 0  Time Calculation (min): 45 min    PT Therapeutic Procedures Time Entry  Therapeutic Exercise Time Entry: 25  Manual Therapy Time Entry: 20          VISIT:# 14    Current Problem  Problem List Items Addressed This Visit           ICD-10-CM    Instability of right shoulder joint M25.311    Decreased range of motion of right shoulder M25.611     Other Visit Diagnoses         Codes      Glenoid labral tear, right, initial encounter     S43.431A             Subjective    At the beginning of the session pt states that she is not currently having any pain in her R shoulder. She states that she has been having a \"cramping\" pain in her deltoid musculature the last few days when she wakes up, but overall has been relatively pain free. She reports no falls.     Pain  Pain Assessment: 0-10  0-10 (Numeric) Pain Score: 0 - No pain  Pain Location: Shoulder  Pain Orientation: Right       Objective     Precautions  Precautions  Precautions Comment: see protocol      Treatments:  DOS 25  EXERCISE 25    WK 8 = 25  8 weeks       #10 #11 #12 #13 #14                   Pulleys      Flexion to 90 deg 3' 3' 3 min 3' 3 min   Pulleys      Scaption/Abduction to 45 deg 3' 3' 3 min 3' 3 min   Pulleys      IR (25) 2' 2' 3 min 3' 3 min           FF to 90 deg 2x10 2x10 2x10 HEP    SCAP to 90 deg 2x10 2x10 2x10 HEP    S/L ER 2x20 2x20 2x20  2x20   S/L abd to 90 2x20 2x20 2x20  2x20   Serratus punch  2 x 10 2x10 2x20  2x20           Tband Pull downs Yellow 2 x 10 Yellow 2 x 10 Red 2x10 Green 2x15 Green 2x15   Tband Mid rows Yellow 2 x 10 Yellow 2 x 10 Red 2x10 Green 2x15 Green 2x15   Tband ER    Red 3x10 Red 3x10   Tband IR    Red 3x10 Red 3x10           Manual PROM (within precautions), scap mobs 15' 20' 20 " min 20' 20 min           Cold pack PRN   @ home             HEP: Access Code: [email or web address]  URL: [email or web address]  Date: 05/09/2025  Prepared by: Emily Mendez    Exercises  - Supine Shoulder Flexion Extension AAROM with Dowel  - 2-3 x daily - 5-7 x weekly - 3 sets - 10 reps - 3 sec hold  - Supine Shoulder Abduction AAROM with Dowel  - 2-3 x daily - 5-7 x weekly - 3 sets - 10 reps  - Supine Shoulder External Rotation with Dowel  - 2-3 x daily - 5-7 x weekly - 3 sets - 10 reps    NEW 6/3/25:  Access Code: [email or web address]  Exercises  - Seated Shoulder Flexion Towel Slide at Table Top  - 2 x daily - 1-2 sets - 10 reps - 3-5 seconds hold  - Seated Shoulder Abduction Towel Slide at Table Top  - 2 x daily - 1-2 sets - 10 reps - 3-5 seconds hold  - Standing Shoulder Internal Rotation Stretch with Dowel (Mirrored)  - 2 x daily - 1-2 sets - 10 reps - 3-5 seconds hold    NEW 6/19/25:  Access Code: [email or web address]  Exercises  - Standing Shoulder Flexion Wall Walk  - 2 x daily - 10 reps - 5-10 hold  - Standing Shoulder Abduction Slides at Wall  - 2 x daily - 10 reps - 5-10 seconds hold  - Standing Shoulder External Rotation Stretch in Doorway  - 2 x daily - 10 reps - 5-10 seconds hold  - Standing Shoulder Internal Rotation Stretch with Dowel (Mirrored)  - 2 x daily - 2 sets - 10 reps - 5-10 seconds hold  AROM: FF, SCAP, ABD        PHASE II (Weeks 6 - 12):   Sling: Discontinue at 6 weeks  Range of Motion: Begin AROM in all planes. 160° FF / 45° ER at side / 160° ABD / IR behind back to waist. No manipulations  Week 8+: Progress motion in all planes as tolerated  Exercise: Continue Phase I; posterior glides are okay (no anterior glides)   Weeks 6-8: Progress to resisted isometrics  Weeks 8-10: Progress to resisted exercises with therabands  Weeks 10-12: Progress to light weights (1-5 lbs); 8-12 reps/2-3 sets per rotator cuff, deltoid, and scapular stabilizers with low abduction angles  Modalities: Per therapist,  including electrical stimulation, ultrasound, heat (before), ice (after)   Assessment:   Pt reported that she was not experiencing any pain at the end of the session but was having some pain in her anterior R shoulder with FF PROM. Pain dissipated with rest and pt stated that it was very minor. Pt tolerated treatment well despite some pain. Pt is recovery well and is on track to begin light weights on 6/27/25 (10 weeks post op).       Plan:   OP PT Plan  Treatment/Interventions: Cryotherapy, Education/ Instruction, Manual therapy, Therapeutic activities, Therapeutic exercises, Ultrasound  PT Plan: Skilled PT  PT Frequency: 2 times per week  Duration: 12 weeks  Onset Date: 04/18/25  Certification Period Start Date: 05/09/25  Certification Period End Date: 08/09/25  Rehab Potential: Good  Plan of Care Agreement: Patient    Goals:  Active       PT Problem       PT Goal 1       Start:  05/09/25    Expected End:  08/09/25       Pt will increase R UE strength to 4+/5 or greater in order to improve tolerance to functional activities and safely return to work as a .          PT Goal 2 (Progressing)       Start:  05/09/25    Expected End:  08/09/25       Pt will increase R shoulder ROM to WNL in order to reduce pain and improve tolerance to functional mobility and safely RTW as a .          PT Goal 3 (Progressing)       Start:  05/09/25    Expected End:  08/09/25       Pt will decrease pain to 1/10 at worst, with pt able to manage symptoms with HEP, positioning and modalities independently.          PT Goal 4 (Progressing)       Start:  05/09/25    Expected End:  08/09/25       Pt will be able to teach back 90% of HEP in order to maintain gains made in PT.

## 2025-06-25 ENCOUNTER — TREATMENT (OUTPATIENT)
Dept: PHYSICAL THERAPY | Facility: HOSPITAL | Age: 35
End: 2025-06-25
Payer: MEDICAID

## 2025-06-25 DIAGNOSIS — M25.311 INSTABILITY OF RIGHT SHOULDER JOINT: ICD-10-CM

## 2025-06-25 DIAGNOSIS — S43.431A GLENOID LABRAL TEAR, RIGHT, INITIAL ENCOUNTER: ICD-10-CM

## 2025-06-25 DIAGNOSIS — M25.611 DECREASED RANGE OF MOTION OF RIGHT SHOULDER: ICD-10-CM

## 2025-06-25 PROCEDURE — 97140 MANUAL THERAPY 1/> REGIONS: CPT | Mod: GP | Performed by: PHYSICAL THERAPIST

## 2025-06-25 PROCEDURE — 97110 THERAPEUTIC EXERCISES: CPT | Mod: GP | Performed by: PHYSICAL THERAPIST

## 2025-06-25 ASSESSMENT — PAIN SCALES - GENERAL: PAINLEVEL_OUTOF10: 0 - NO PAIN

## 2025-06-25 NOTE — PROGRESS NOTES
Physical Therapy    Physical Therapy Treatment    Patient Name: Ruthann Nichole  MRN: [id number]  : 1990   Today's Date: 2025  Time Calculation  Start Time: 945  Stop Time:   Time Calculation (min): 50 min       PT Therapeutic Procedures Time Entry  Therapeutic Exercise Time Entry: 30  Manual Therapy Time Entry: 20      Visit #15    Assessment:   Progressed resistance with pt tolerating with c/o fatigue. Advanced her HEP to include T-band strengthening of which she is able to demonstrate an understanding.     Pt reports 0/10 pain after treatment.    Plan: Review HEP, add wall push-ups.  OP PT Plan  Treatment/Interventions: Cryotherapy, Education/ Instruction, Manual therapy, Therapeutic activities, Therapeutic exercises, Ultrasound  PT Plan: Skilled PT  PT Frequency: 2 times per week  Duration: 12 weeks  Onset Date: 25  Certification Period Start Date: 25  Certification Period End Date: 25  Rehab Potential: Good  Plan of Care Agreement: Patient    Current Problem  1. Decreased range of motion of right shoulder        2. Glenoid labral tear, right, initial encounter  Follow Up In Physical Therapy      3. Instability of right shoulder joint  Follow Up In Physical Therapy          Subjective   General  Pt reports no shoulder pain and doing HEP. Nothing new to report.     Precautions  Precautions  Precautions Comment: see protocol    Pain  0-10 (Numeric) Pain Score: 0 - No pain    Objective   AROM  FF: 105 deg  ABD: 96 deg  IR: t10    Treatments:     EXERCISE 2025    WK 8 = 25       WK 10 = 25 #12 #13 #14 #15/24                 Pulleys      Flexion to 90 deg 3 min 3' 3 min 3'   Pulleys      Scaption/Abduction to 45 deg 3 min 3' 3 min 3'   Pulleys      IR (25) 3 min 3' 3 min 3'          Wall push-ups              Standing FF to 90 deg 2x10 HEP  1# 2x10   Standing SCAP to 90 deg 2x10 HEP  1# 2x10   S/L ER 2x20  2x20 1# 2x15   S/L abd to 90  2x20  2x20 1# 2x15   Serratus punch  2x20  2x20 1# 2x15          Tband Pull downs Red 2x10 Green 2x15 Green 2x15 Green 2x15- HEP   Tband Mid rows Red 2x10 Green 2x15 Green 2x15 Green 2x15-HEP   Tband ER  Red 3x10 Red 3x10 Green 2x10   Tband IR  Red 3x10 Red 3x10 Green 2x10          Manual PROM (within precautions), scap mobs 20 min 20' 20 min 20'          Cold pack PRN @ home             HEP: Access Code: [email or web address]  URL: [email or web address]  Date: 05/09/2025  Prepared by: Emily Mendez    Exercises  - Supine Shoulder Flexion Extension AAROM with Dowel  - 2-3 x daily - 5-7 x weekly - 3 sets - 10 reps - 3 sec hold  - Supine Shoulder Abduction AAROM with Dowel  - 2-3 x daily - 5-7 x weekly - 3 sets - 10 reps  - Supine Shoulder External Rotation with Dowel  - 2-3 x daily - 5-7 x weekly - 3 sets - 10 reps    NEW 6/3/25:  Access Code: [email or web address]  Exercises  - Seated Shoulder Flexion Towel Slide at Table Top  - 2 x daily - 1-2 sets - 10 reps - 3-5 seconds hold  - Seated Shoulder Abduction Towel Slide at Table Top  - 2 x daily - 1-2 sets - 10 reps - 3-5 seconds hold  - Standing Shoulder Internal Rotation Stretch with Dowel (Mirrored)  - 2 x daily - 1-2 sets - 10 reps - 3-5 seconds hold    NEW 6/19/25:  Access Code: [email or web address]  Exercises  - Standing Shoulder Flexion Wall Walk  - 2 x daily - 10 reps - 5-10 hold  - Standing Shoulder Abduction Slides at Wall  - 2 x daily - 10 reps - 5-10 seconds hold  - Standing Shoulder External Rotation Stretch in Doorway  - 2 x daily - 10 reps - 5-10 seconds hold  - Standing Shoulder Internal Rotation Stretch with Dowel (Mirrored)  - 2 x daily - 2 sets - 10 reps - 5-10 seconds hold  AROM: FF, SCAP, ABD    NEW 6/25/25:  Access Code: [email or web address]  URL: [email or web address]  Date: 06/25/2025  Prepared by: Arnaldo Hoskins    Exercises  - Shoulder External Rotation with Anchored Resistance  - 2 x daily - 2-3 sets - 10 reps  - Shoulder Internal Rotation with  Resistance  - 2 x daily - 2-3 sets - 10 reps  - Scapular Retraction with Resistance  - 2 x daily - 2-3 sets - 10 reps  - Scapular Retraction with Resistance Advanced  - 2 x daily - 2-3 sets - 10 reps                               Goals:  Active       PT Problem       PT Goal 1       Start:  05/09/25    Expected End:  08/09/25       Pt will increase R UE strength to 4+/5 or greater in order to improve tolerance to functional activities and safely return to work as a .          PT Goal 2 (Progressing)       Start:  05/09/25    Expected End:  08/09/25       Pt will increase R shoulder ROM to WNL in order to reduce pain and improve tolerance to functional mobility and safely RTW as a .          PT Goal 3 (Progressing)       Start:  05/09/25    Expected End:  08/09/25       Pt will decrease pain to 1/10 at worst, with pt able to manage symptoms with HEP, positioning and modalities independently.          PT Goal 4 (Progressing)       Start:  05/09/25    Expected End:  08/09/25       Pt will be able to teach back 90% of HEP in order to maintain gains made in PT.

## 2025-06-26 ENCOUNTER — APPOINTMENT (OUTPATIENT)
Dept: OBSTETRICS AND GYNECOLOGY | Facility: CLINIC | Age: 35
End: 2025-06-26
Payer: MEDICAID

## 2025-06-26 VITALS
BODY MASS INDEX: 28.58 KG/M2 | HEIGHT: 69 IN | DIASTOLIC BLOOD PRESSURE: 70 MMHG | SYSTOLIC BLOOD PRESSURE: 108 MMHG | WEIGHT: 193 LBS

## 2025-06-26 DIAGNOSIS — Z11.51 SCREENING FOR HUMAN PAPILLOMAVIRUS (HPV): ICD-10-CM

## 2025-06-26 DIAGNOSIS — Z12.4 PAP SMEAR FOR CERVICAL CANCER SCREENING: ICD-10-CM

## 2025-06-26 DIAGNOSIS — R35.0 URINARY FREQUENCY: Primary | ICD-10-CM

## 2025-06-26 PROCEDURE — 99395 PREV VISIT EST AGE 18-39: CPT | Performed by: NURSE PRACTITIONER

## 2025-06-26 PROCEDURE — 1036F TOBACCO NON-USER: CPT | Performed by: NURSE PRACTITIONER

## 2025-06-26 PROCEDURE — 87626 HPV SEP HI-RSK TYP&POOL RSLT: CPT

## 2025-06-26 PROCEDURE — 3008F BODY MASS INDEX DOCD: CPT | Performed by: NURSE PRACTITIONER

## 2025-06-26 RX ORDER — NITROFURANTOIN 25; 75 MG/1; MG/1
100 CAPSULE ORAL 2 TIMES DAILY
Qty: 14 CAPSULE | Refills: 0 | Status: SHIPPED | OUTPATIENT
Start: 2025-06-26 | End: 2025-07-03

## 2025-06-26 SDOH — ECONOMIC STABILITY: FOOD INSECURITY: WITHIN THE PAST 12 MONTHS, THE FOOD YOU BOUGHT JUST DIDN'T LAST AND YOU DIDN'T HAVE MONEY TO GET MORE.: NEVER TRUE

## 2025-06-26 SDOH — ECONOMIC STABILITY: FOOD INSECURITY: WITHIN THE PAST 12 MONTHS, YOU WORRIED THAT YOUR FOOD WOULD RUN OUT BEFORE YOU GOT MONEY TO BUY MORE.: NEVER TRUE

## 2025-06-26 SDOH — ECONOMIC STABILITY: TRANSPORTATION INSECURITY
IN THE PAST 12 MONTHS, HAS LACK OF TRANSPORTATION KEPT YOU FROM MEETINGS, WORK, OR FROM GETTING THINGS NEEDED FOR DAILY LIVING?: NO

## 2025-06-26 SDOH — HEALTH STABILITY: PHYSICAL HEALTH: ON AVERAGE, HOW MANY DAYS PER WEEK DO YOU ENGAGE IN MODERATE TO STRENUOUS EXERCISE (LIKE A BRISK WALK)?: 7 DAYS

## 2025-06-26 SDOH — ECONOMIC STABILITY: INCOME INSECURITY: IN THE LAST 12 MONTHS, WAS THERE A TIME WHEN YOU WERE NOT ABLE TO PAY THE MORTGAGE OR RENT ON TIME?: NO

## 2025-06-26 SDOH — ECONOMIC STABILITY: TRANSPORTATION INSECURITY
IN THE PAST 12 MONTHS, HAS THE LACK OF TRANSPORTATION KEPT YOU FROM MEDICAL APPOINTMENTS OR FROM GETTING MEDICATIONS?: NO

## 2025-06-26 SDOH — HEALTH STABILITY: PHYSICAL HEALTH: ON AVERAGE, HOW MANY MINUTES DO YOU ENGAGE IN EXERCISE AT THIS LEVEL?: 60 MIN

## 2025-06-26 ASSESSMENT — ENCOUNTER SYMPTOMS
RESPIRATORY NEGATIVE: 1
DYSURIA: 1
PSYCHIATRIC NEGATIVE: 1
FREQUENCY: 1
CONSTITUTIONAL NEGATIVE: 1
GASTROINTESTINAL NEGATIVE: 1
LOSS OF SENSATION IN FEET: 0
OCCASIONAL FEELINGS OF UNSTEADINESS: 0
DEPRESSION: 0

## 2025-06-26 ASSESSMENT — LIFESTYLE VARIABLES
HOW OFTEN DO YOU HAVE SIX OR MORE DRINKS ON ONE OCCASION: NEVER
HOW MANY STANDARD DRINKS CONTAINING ALCOHOL DO YOU HAVE ON A TYPICAL DAY: PATIENT DOES NOT DRINK
SKIP TO QUESTIONS 9-10: 1
AUDIT-C TOTAL SCORE: 0
HOW OFTEN DO YOU HAVE A DRINK CONTAINING ALCOHOL: NEVER

## 2025-06-26 NOTE — PROGRESS NOTES
Subjective   Patient ID: Ruthann Nichole is a 35 y.o. female who presents for Annual Exam (Normal PAP 6/2/2020).  35 year old here for annual exam with complaints of having painful urination, urinary frequency.  She notes frequent UTIs in the past.  Her lower back pain, painful urination and urinary frequency began about 7-8 days ago.  She denies any abnormal bleeding, pain, discharge, and breast complaints.  She is due for her pap today.         Review of Systems   Constitutional: Negative.    Respiratory: Negative.     Gastrointestinal: Negative.    Genitourinary:  Positive for dysuria and frequency.   Skin: Negative.    Psychiatric/Behavioral: Negative.         Objective   Physical Exam  Vitals reviewed.   Constitutional:       Appearance: Normal appearance. She is well-developed.   Pulmonary:      Effort: Pulmonary effort is normal. No respiratory distress.   Chest:   Breasts:     Breasts are symmetrical.      Right: Normal. No swelling, bleeding, inverted nipple, mass, nipple discharge, skin change or tenderness.      Left: Normal. No swelling, bleeding, inverted nipple, mass, nipple discharge, skin change or tenderness.   Abdominal:      Palpations: Abdomen is soft.   Genitourinary:     General: Normal vulva.      Exam position: Lithotomy position.      Pubic Area: No rash.       Labia:         Right: No rash, tenderness, lesion or injury.         Left: No rash, tenderness, lesion or injury.       Urethra: No prolapse, urethral pain, urethral swelling or urethral lesion.      Vagina: Normal.      Cervix: Normal.      Uterus: Normal.       Adnexa: Right adnexa normal and left adnexa normal.      Rectum: Normal.      Comments: Pap/hpv sent  Musculoskeletal:         General: Normal range of motion.   Lymphadenopathy:      Upper Body:      Right upper body: No supraclavicular, axillary or pectoral adenopathy.      Left upper body: No supraclavicular, axillary or pectoral adenopathy.   Skin:     General: Skin is warm  and dry.   Neurological:      General: No focal deficit present.      Mental Status: She is alert and oriented to person, place, and time. Mental status is at baseline.   Psychiatric:         Attention and Perception: Attention and perception normal.         Mood and Affect: Mood and affect normal.         Speech: Speech normal.         Behavior: Behavior normal. Behavior is cooperative.         Thought Content: Thought content normal.         Judgment: Judgment normal.         Assessment/Plan   Problem List Items Addressed This Visit    None  Visit Diagnoses         Codes      Urinary frequency    -  Primary R35.0    Relevant Medications    nitrofurantoin, macrocrystal-monohydrate, (Macrobid) 100 mg capsule      Pap smear for cervical cancer screening     Z12.4    Relevant Orders    THINPREP PAP TEST      Screening for human papillomavirus (HPV)     Z11.51    Relevant Orders    THINPREP PAP TEST        Pap/hpv sent  Macrobid ordered   UAC&S sent  Follow up 1 year for annual exam, sooner as needed if problems arise         ROLDAN Lizarraga-CNP 06/26/25 10:30 AM

## 2025-06-28 LAB — BACTERIA UR CULT: NORMAL

## 2025-06-30 ENCOUNTER — TREATMENT (OUTPATIENT)
Dept: PHYSICAL THERAPY | Facility: HOSPITAL | Age: 35
End: 2025-06-30
Payer: MEDICAID

## 2025-06-30 DIAGNOSIS — M25.311 INSTABILITY OF RIGHT SHOULDER JOINT: ICD-10-CM

## 2025-06-30 DIAGNOSIS — S43.431A GLENOID LABRAL TEAR, RIGHT, INITIAL ENCOUNTER: ICD-10-CM

## 2025-06-30 DIAGNOSIS — M25.611 DECREASED RANGE OF MOTION OF RIGHT SHOULDER: ICD-10-CM

## 2025-06-30 PROCEDURE — 97110 THERAPEUTIC EXERCISES: CPT | Mod: GP | Performed by: PHYSICAL THERAPIST

## 2025-06-30 PROCEDURE — 97140 MANUAL THERAPY 1/> REGIONS: CPT | Mod: GP | Performed by: PHYSICAL THERAPIST

## 2025-06-30 ASSESSMENT — PAIN SCALES - GENERAL: PAINLEVEL_OUTOF10: 0 - NO PAIN

## 2025-06-30 NOTE — PROGRESS NOTES
"Physical Therapy    Physical Therapy Treatment    Patient Name: Ruthann Nichole  MRN: [id number]  : 1990   Today's Date: 2025  Time Calculation  Start Time: 945  Stop Time: 0  Time Calculation (min): 45 min       PT Therapeutic Procedures Time Entry  Therapeutic Exercise Time Entry: 25  Manual Therapy Time Entry: 20      Visit #16    Assessment:   Added doorway 60 deg ER/pec stretch with good tolerance. Added contract/rleax stretching for progression of ROM.    Pt reports 0/10 pain after treatment.    Plan: Add wall push ups.  OP PT Plan  Treatment/Interventions: Cryotherapy, Education/ Instruction, Manual therapy, Therapeutic activities, Therapeutic exercises, Ultrasound  PT Plan: Skilled PT  PT Frequency: 2 times per week  Duration: 12 weeks  Onset Date: 25  Certification Period Start Date: 25  Certification Period End Date: 25  Rehab Potential: Good  Plan of Care Agreement: Patient    Current Problem  1. Decreased range of motion of right shoulder        2. Glenoid labral tear, right, initial encounter  Follow Up In Physical Therapy      3. Instability of right shoulder joint  Follow Up In Physical Therapy          Subjective   General  No pain, doing most things from PT at home.     Precautions  Precautions  Precautions Comment: see protocol    Pain  0-10 (Numeric) Pain Score: 0 - No pain    Objective      AROM in supine: 130 deg    Treatments:     EXERCISE 2025    WK 8 = 25       WK 10 = 25 #13 #14 #15/24 #16/24                 Pulleys      Flexion to 90 deg 3' 3 min 3' 3'   Pulleys      Scaption/Abduction to 45 deg 3' 3 min 3' 3'   Pulleys      IR (25) 3' 3 min 3' 3'          Wall push-ups       Doorway ER at 60 deg    30\"x3                 Standing FF to 90 deg HEP  1# 2x10    Standing SCAP to 90 deg HEP  1# 2x10    S/L ER  2x20 1# 2x15 1# 2x15   S/L abd to 90  2x20 1# 2x15 1# 2x15   Serratus punch   2x20 1# 2x15 1# 2x20        "   Tband Pull downs Green 2x15 Green 2x15 Green 2x15- HEP Rev   Tband Mid rows Green 2x15 Green 2x15 Green 2x15-HEP Rev   Tband ER Red 3x10 Red 3x10 Green 2x10 Rev   Tband IR Red 3x10 Red 3x10 Green 2x10 Rev          Manual PROM (within precautions), scap mobs 20' 20 min 20' 20'          Cold pack PRN              HEP: Access Code: [email or web address]  URL: [email or web address]  Date: 05/09/2025  Prepared by: Emily Mendez    Exercises  - Supine Shoulder Flexion Extension AAROM with Dowel  - 2-3 x daily - 5-7 x weekly - 3 sets - 10 reps - 3 sec hold  - Supine Shoulder Abduction AAROM with Dowel  - 2-3 x daily - 5-7 x weekly - 3 sets - 10 reps  - Supine Shoulder External Rotation with Dowel  - 2-3 x daily - 5-7 x weekly - 3 sets - 10 reps    NEW 6/3/25:  Access Code: [email or web address]  Exercises  - Seated Shoulder Flexion Towel Slide at Table Top  - 2 x daily - 1-2 sets - 10 reps - 3-5 seconds hold  - Seated Shoulder Abduction Towel Slide at Table Top  - 2 x daily - 1-2 sets - 10 reps - 3-5 seconds hold  - Standing Shoulder Internal Rotation Stretch with Dowel (Mirrored)  - 2 x daily - 1-2 sets - 10 reps - 3-5 seconds hold    NEW 6/19/25:  Access Code: [email or web address]  Exercises  - Standing Shoulder Flexion Wall Walk  - 2 x daily - 10 reps - 5-10 hold  - Standing Shoulder Abduction Slides at Wall  - 2 x daily - 10 reps - 5-10 seconds hold  - Standing Shoulder External Rotation Stretch in Doorway  - 2 x daily - 10 reps - 5-10 seconds hold  - Standing Shoulder Internal Rotation Stretch with Dowel (Mirrored)  - 2 x daily - 2 sets - 10 reps - 5-10 seconds hold  AROM: FF, SCAP, ABD    NEW 6/25/25:  Access Code: [email or web address]  URL: [email or web address]  Date: 06/25/2025  Prepared by: Arnaldo Hoskins    Exercises  - Shoulder External Rotation with Anchored Resistance  - 2 x daily - 2-3 sets - 10 reps  - Shoulder Internal Rotation with Resistance  - 2 x daily - 2-3 sets - 10 reps  - Scapular Retraction with  Resistance  - 2 x daily - 2-3 sets - 10 reps  - Scapular Retraction with Resistance Advanced  - 2 x daily - 2-3 sets - 10 reps                                  Goals:  Active       PT Problem       PT Goal 1       Start:  05/09/25    Expected End:  08/09/25       Pt will increase R UE strength to 4+/5 or greater in order to improve tolerance to functional activities and safely return to work as a .          PT Goal 2 (Progressing)       Start:  05/09/25    Expected End:  08/09/25       Pt will increase R shoulder ROM to WNL in order to reduce pain and improve tolerance to functional mobility and safely RTW as a .          PT Goal 3 (Progressing)       Start:  05/09/25    Expected End:  08/09/25       Pt will decrease pain to 1/10 at worst, with pt able to manage symptoms with HEP, positioning and modalities independently.          PT Goal 4 (Progressing)       Start:  05/09/25    Expected End:  08/09/25       Pt will be able to teach back 90% of HEP in order to maintain gains made in PT.

## 2025-07-02 ENCOUNTER — TREATMENT (OUTPATIENT)
Dept: PHYSICAL THERAPY | Facility: HOSPITAL | Age: 35
End: 2025-07-02
Payer: MEDICAID

## 2025-07-02 DIAGNOSIS — M25.611 DECREASED RANGE OF MOTION OF RIGHT SHOULDER: ICD-10-CM

## 2025-07-02 DIAGNOSIS — S43.431A GLENOID LABRAL TEAR, RIGHT, INITIAL ENCOUNTER: ICD-10-CM

## 2025-07-02 DIAGNOSIS — M25.311 INSTABILITY OF RIGHT SHOULDER JOINT: ICD-10-CM

## 2025-07-02 PROCEDURE — 97110 THERAPEUTIC EXERCISES: CPT | Mod: GP | Performed by: PHYSICAL THERAPIST

## 2025-07-02 PROCEDURE — 97140 MANUAL THERAPY 1/> REGIONS: CPT | Mod: GP | Performed by: PHYSICAL THERAPIST

## 2025-07-02 ASSESSMENT — PAIN SCALES - GENERAL: PAINLEVEL_OUTOF10: 0 - NO PAIN

## 2025-07-02 NOTE — PROGRESS NOTES
Physical Therapy    Physical Therapy Treatment / Reassessment    Patient Name: Ruthann Nichole  MRN: [id number]  : 1990   Today's Date: 2025  Time Calculation  Start Time: 940  Stop Time:   Time Calculation (min): 55 min       PT Therapeutic Procedures Time Entry  Therapeutic Exercise Time Entry: 30  Manual Therapy Time Entry: 25      Visit #17    Assessment:   Improving ROM, strength, function with progress towards all goals. She would benefit from continued PT for manual stretching and mobs to further progress towards ROM goal for improved function.     Pt reports 0/10 pain after treatment.    Plan: Increase wall push up reps.   OP PT Plan  Treatment/Interventions: Cryotherapy, Education/ Instruction, Manual therapy, Therapeutic activities, Therapeutic exercises, Ultrasound  PT Plan: Skilled PT  PT Frequency: 2 times per week  Duration: 12 weeks  Onset Date: 25  Certification Period Start Date: 25  Certification Period End Date: 25  Rehab Potential: Good  Plan of Care Agreement: Patient    Current Problem  1. Decreased range of motion of right shoulder        2. Glenoid labral tear, right, initial encounter  Follow Up In Physical Therapy      3. Instability of right shoulder joint  Follow Up In Physical Therapy          Subjective   General  Pt states she wasn't sore after last visit and she's keeping up with HEP.    Other Measures  Disability of Arm Shoulder Hand (DASH): 36      Precautions  Precautions  Precautions Comment: see protocol    Pain  0-10 (Numeric) Pain Score: 0 - No pain    Objective   AROM  FF: 112 deg  ABD: 88 deg  IR: to T7      PROM  FF: 144 deg  ABD: 123 deg  ER: 58 deg    Strength:  FF, ABD, ER: 4-/5  IR: 5/5  Elbow FLEX: 5/5  Elbow EXT: 4-/5    Other Measures  Disability of Arm Shoulder Hand (DASH): 28  Treatments:     EXERCISE 2025    WK 8 = 25       WK 10 = 25 #14 #1524 #1624 #17/                 Pulleys       "Flexion to 90 deg 3 min 3' 3' 3'   Pulleys      Scaption/Abduction to 45 deg 3 min 3' 3' 3'   Pulleys      IR (5/30/25) 3 min 3' 3' 3'          Wall push-ups    10x   Doorway ER at 60 deg   30\"x3 3x30\"                 Standing FF to 90 deg  1# 2x10  1# 2x10   Standing SCAP to 90 deg  1# 2x10  1#2x10   S/L ER 2x20 1# 2x15 1# 2x15 1# 2x15   S/L abd to 90 2x20 1# 2x15 1# 2x15 1# 2x15   Serratus punch  2x20 1# 2x15 1# 2x20 1# 2x20          Tband Pull downs Green 2x15 Green 2x15- HEP Rev    Tband Mid rows Green 2x15 Green 2x15-HEP Rev    Tband ER Red 3x10 Green 2x10 Rev    Tband IR Red 3x10 Green 2x10 Rev           Manual PROM (within precautions), scap mobs 20 min 20' 20' 25'          Cold pack PRN           Reassess - 10'   HEP: Access Code: [email or web address]  URL: [email or web address]  Date: 05/09/2025  Prepared by: Emily Mendez    Exercises  - Supine Shoulder Flexion Extension AAROM with Dowel  - 2-3 x daily - 5-7 x weekly - 3 sets - 10 reps - 3 sec hold  - Supine Shoulder Abduction AAROM with Dowel  - 2-3 x daily - 5-7 x weekly - 3 sets - 10 reps  - Supine Shoulder External Rotation with Dowel  - 2-3 x daily - 5-7 x weekly - 3 sets - 10 reps    NEW 6/3/25:  Access Code: [email or web address]  Exercises  - Seated Shoulder Flexion Towel Slide at Table Top  - 2 x daily - 1-2 sets - 10 reps - 3-5 seconds hold  - Seated Shoulder Abduction Towel Slide at Table Top  - 2 x daily - 1-2 sets - 10 reps - 3-5 seconds hold  - Standing Shoulder Internal Rotation Stretch with Dowel (Mirrored)  - 2 x daily - 1-2 sets - 10 reps - 3-5 seconds hold    NEW 6/19/25:  Access Code: [email or web address]  Exercises  - Standing Shoulder Flexion Wall Walk  - 2 x daily - 10 reps - 5-10 hold  - Standing Shoulder Abduction Slides at Wall  - 2 x daily - 10 reps - 5-10 seconds hold  - Standing Shoulder External Rotation Stretch in Doorway  - 2 x daily - 10 reps - 5-10 seconds hold  - Standing Shoulder Internal Rotation Stretch with Dowel (Mirrored)  - 2 x " daily - 2 sets - 10 reps - 5-10 seconds hold  AROM: FF, SCAP, ABD    NEW 6/25/25:  Access Code: [email or web address]  URL: [email or web address]  Date: 06/25/2025  Prepared by: Arnaldo Hoskins    Exercises  - Shoulder External Rotation with Anchored Resistance  - 2 x daily - 2-3 sets - 10 reps  - Shoulder Internal Rotation with Resistance  - 2 x daily - 2-3 sets - 10 reps  - Scapular Retraction with Resistance  - 2 x daily - 2-3 sets - 10 reps  - Scapular Retraction with Resistance Advanced  - 2 x daily - 2-3 sets - 10 reps                  Goals:  Active       PT Problem       PT Goal 1 (Progressing)       Start:  05/09/25    Expected End:  08/09/25       Pt will increase R UE strength to 4+/5 or greater in order to improve tolerance to functional activities and safely return to work as a .          PT Goal 2 (Progressing)       Start:  05/09/25    Expected End:  08/09/25       Pt will increase R shoulder ROM to WNL in order to reduce pain and improve tolerance to functional mobility and safely RTW as a .          PT Goal 3 (Met)       Start:  05/09/25    Expected End:  08/09/25    Resolved:  07/02/25    Pt will decrease pain to 1/10 at worst, with pt able to manage symptoms with HEP, positioning and modalities independently.          PT Goal 4 (Progressing)       Start:  05/09/25    Expected End:  08/09/25       Pt will be able to teach back 90% of HEP in order to maintain gains made in PT.

## 2025-07-03 ENCOUNTER — TELEPHONE (OUTPATIENT)
Dept: PRIMARY CARE | Facility: CLINIC | Age: 35
End: 2025-07-03

## 2025-07-03 ENCOUNTER — APPOINTMENT (OUTPATIENT)
Dept: PRIMARY CARE | Facility: CLINIC | Age: 35
End: 2025-07-03
Payer: MEDICAID

## 2025-07-03 VITALS
TEMPERATURE: 97.4 F | BODY MASS INDEX: 28.21 KG/M2 | SYSTOLIC BLOOD PRESSURE: 124 MMHG | DIASTOLIC BLOOD PRESSURE: 82 MMHG | HEART RATE: 50 BPM | WEIGHT: 191 LBS | OXYGEN SATURATION: 98 %

## 2025-07-03 DIAGNOSIS — J45.990 EXERCISE-INDUCED ASTHMA: Primary | ICD-10-CM

## 2025-07-03 DIAGNOSIS — L23.7 POISON IVY: ICD-10-CM

## 2025-07-03 DIAGNOSIS — E66.09 CLASS 1 OBESITY DUE TO EXCESS CALORIES WITH SERIOUS COMORBIDITY AND BODY MASS INDEX (BMI) OF 31.0 TO 31.9 IN ADULT: ICD-10-CM

## 2025-07-03 DIAGNOSIS — E66.811 CLASS 1 OBESITY DUE TO EXCESS CALORIES WITH SERIOUS COMORBIDITY AND BODY MASS INDEX (BMI) OF 31.0 TO 31.9 IN ADULT: ICD-10-CM

## 2025-07-03 PROCEDURE — 1036F TOBACCO NON-USER: CPT | Performed by: FAMILY MEDICINE

## 2025-07-03 PROCEDURE — 99214 OFFICE O/P EST MOD 30 MIN: CPT | Performed by: FAMILY MEDICINE

## 2025-07-03 RX ORDER — DESONIDE 0.5 MG/G
CREAM TOPICAL 2 TIMES DAILY
Qty: 60 G | Refills: 2 | Status: SHIPPED | OUTPATIENT
Start: 2025-07-03 | End: 2026-07-03

## 2025-07-03 RX ORDER — TIRZEPATIDE 15 MG/.5ML
15 INJECTION, SOLUTION SUBCUTANEOUS WEEKLY
Qty: 2 ML | Refills: 3 | Status: SHIPPED | OUTPATIENT
Start: 2025-07-03 | End: 2025-07-03 | Stop reason: SDUPTHER

## 2025-07-03 RX ORDER — TIRZEPATIDE 15 MG/.5ML
15 INJECTION, SOLUTION SUBCUTANEOUS WEEKLY
Qty: 2 ML | Refills: 3 | Status: SHIPPED | OUTPATIENT
Start: 2025-07-03

## 2025-07-03 RX ORDER — FLUTICASONE PROPIONATE AND SALMETEROL 100; 50 UG/1; UG/1
1 POWDER RESPIRATORY (INHALATION)
Qty: 60 EACH | Refills: 5 | Status: SHIPPED | OUTPATIENT
Start: 2025-07-03 | End: 2025-07-03 | Stop reason: ALTCHOICE

## 2025-07-03 RX ORDER — BUDESONIDE AND FORMOTEROL FUMARATE DIHYDRATE 160; 4.5 UG/1; UG/1
2 AEROSOL RESPIRATORY (INHALATION)
Qty: 10.2 G | Refills: 11 | Status: SHIPPED | OUTPATIENT
Start: 2025-07-03 | End: 2026-07-03

## 2025-07-03 RX ORDER — TRIAMCINOLONE ACETONIDE 1 MG/G
CREAM TOPICAL 2 TIMES DAILY
Qty: 80 G | Refills: 1 | Status: SHIPPED | OUTPATIENT
Start: 2025-07-03

## 2025-07-03 NOTE — TELEPHONE ENCOUNTER
Per EPA system, Pt's Advair is not covered  Brand Name Symbicort is preferred, or pt would need a trial of Symbicort before approving Advair  Has pt tried Symbicort in past?  Ok to switch?  Please advise. Thanks. JW

## 2025-07-03 NOTE — PROGRESS NOTES
Subjective   Patient ID: Ruthann Nichole is a 35 y.o. female who presents for Shoulder Pain (Chronic).  HPI  Memory issues are about the same.  She wears a helmet when she rides these days but works as a .  She reports her mood is overall good.     Having a lot of joint pain.    Left shoulder surgery went well.  She is rehabbing.    Obesity: weight is down 30 lbs since starting MJ.  Doing well.  Appetite is controlled.    Patient Active Problem List   Diagnosis    Pelvic pain    Glenoid labrum tear, right, initial encounter    Synovitis of right shoulder    Instability of right shoulder joint    Hill Sachs deformity, right    Decreased range of motion of right shoulder       Social Connections: Not on file       Current Outpatient Medications on File Prior to Visit   Medication Sig Dispense Refill    cyanocobalamin (Vitamin B-12) 1,000 mcg tablet Take 1 tablet (1,000 mcg) by mouth 4 times a week.      nitrofurantoin, macrocrystal-monohydrate, (Macrobid) 100 mg capsule Take 1 capsule (100 mg) by mouth 2 times a day for 7 days. 14 capsule 0    [DISCONTINUED] tirzepatide (Mounjaro) 15 mg/0.5 mL pen injector Inject 15 mg under the skin 1 (one) time per week. Compound with B12 1000 mcg weekly 2 mL 2     No current facility-administered medications on file prior to visit.        Vitals:    07/03/25 1112   BP: 124/82   Pulse: 50   Temp: 36.3 °C (97.4 °F)   SpO2: 98%     Vitals:    07/03/25 1112   Weight: 86.6 kg (191 lb)       Review of Systems   All other systems reviewed and are negative.      Objective     Physical Exam  Vitals reviewed.   Constitutional:       General: She is not in acute distress.     Appearance: Normal appearance. She is well-developed. She is not diaphoretic.   HENT:      Head: Normocephalic and atraumatic.      Right Ear: Tympanic membrane normal.      Left Ear: Tympanic membrane normal.      Nose: Nose normal.      Mouth/Throat:      Mouth: Mucous membranes are moist.   Eyes:      Pupils:  Pupils are equal, round, and reactive to light.   Cardiovascular:      Rate and Rhythm: Normal rate and regular rhythm.      Heart sounds: Normal heart sounds. No murmur heard.     No friction rub. No gallop.   Pulmonary:      Effort: Pulmonary effort is normal.      Breath sounds: Normal breath sounds. No rales.   Abdominal:      General: Bowel sounds are normal.      Palpations: Abdomen is soft.      Tenderness: There is no abdominal tenderness.   Musculoskeletal:      Cervical back: Normal range of motion and neck supple.   Skin:     General: Skin is warm and dry.   Neurological:      Mental Status: She is alert.   Psychiatric:         Mood and Affect: Mood normal.         Office Visit on 06/26/2025   Component Date Value Ref Range Status    CULTURE, URINE, ROUTINE 06/26/2025 SEE NOTE   Final    Comment:     CULTURE, URINE, ROUTINE         Micro Number:      [id number]    Test Status:       Final    Specimen Source:   Urine, clean catch    Specimen Quality:  Adequate    Result:            No Growth         Assessment/Plan   Problem List Items Addressed This Visit    None  Visit Diagnoses         Codes      Exercise-induced asthma    -  Primary J45.990    Relevant Medications    fluticasone propion-salmeteroL (Advair Diskus) 100-50 mcg/dose diskus inhaler      Class 1 obesity due to excess calories with serious comorbidity and body mass index (BMI) of 31.0 to 31.9 in adult     E66.811, E66.09, Z68.31    Relevant Medications    tirzepatide (Mounjaro) 15 mg/0.5 mL pen injector      Poison ivy     L23.7    Relevant Medications    triamcinolone (Kenalog) 0.1 % cream    desonide (DesOwen) 0.05 % cream          Doing great on Zepbound.    Fu in 6 mo    Adding on Advair for exercise induced asthma.

## 2025-07-07 ENCOUNTER — TREATMENT (OUTPATIENT)
Dept: PHYSICAL THERAPY | Facility: HOSPITAL | Age: 35
End: 2025-07-07
Payer: MEDICAID

## 2025-07-07 DIAGNOSIS — M25.311 INSTABILITY OF RIGHT SHOULDER JOINT: ICD-10-CM

## 2025-07-07 DIAGNOSIS — M25.611 DECREASED RANGE OF MOTION OF RIGHT SHOULDER: ICD-10-CM

## 2025-07-07 DIAGNOSIS — S43.431A GLENOID LABRAL TEAR, RIGHT, INITIAL ENCOUNTER: ICD-10-CM

## 2025-07-07 PROCEDURE — 97110 THERAPEUTIC EXERCISES: CPT | Mod: GP | Performed by: PHYSICAL THERAPIST

## 2025-07-07 PROCEDURE — 97140 MANUAL THERAPY 1/> REGIONS: CPT | Mod: GP | Performed by: PHYSICAL THERAPIST

## 2025-07-07 ASSESSMENT — PAIN SCALES - GENERAL: PAINLEVEL_OUTOF10: 0 - NO PAIN

## 2025-07-07 NOTE — PROGRESS NOTES
"Physical Therapy    Physical Therapy Treatment    Patient Name: Ruthann Nichole  MRN: [id number]  : 1990   Today's Date: 2025  Time Calculation  Start Time: 151  Stop Time: 1610  Time Calculation (min): 55 min       PT Therapeutic Procedures Time Entry  Therapeutic Exercise Time Entry: 30  Manual Therapy Time Entry: 25          Visit #18    Assessment:   Addition of weighted wand for combination stretching/strengthening into FF. ROM a little off last measurement.    Pt reports 0/10 pain after treatment.    Plan: Add 4-way ball on wall shoulder stabilization.  OP PT Plan  Treatment/Interventions: Cryotherapy, Education/ Instruction, Manual therapy, Therapeutic activities, Therapeutic exercises, Ultrasound  PT Plan: Skilled PT  PT Frequency: 2 times per week  Duration: 12 weeks  Onset Date: 25  Certification Period Start Date: 25  Certification Period End Date: 25  Rehab Potential: Good  Plan of Care Agreement: Patient    Current Problem  1. Decreased range of motion of right shoulder        2. Glenoid labral tear, right, initial encounter  Follow Up In Physical Therapy      3. Instability of right shoulder joint  Follow Up In Physical Therapy          Subjective   General  Pt states she has been able to use the arm more. Doing HEP but wall slides int ABD hurts.     Precautions  Precautions  Precautions Comment: see protocol    Pain  0-10 (Numeric) Pain Score: 0 - No pain    Objective   PROM   deg    Treatments:     EXERCISE 2025    WK 8 = 25       WK 10 = 25 #15/24 #16 #17 #18                 Pulleys      Flexion to 90 deg 3' 3' 3' 3'   Pulleys      Scaption/Abduction to 45 deg 3' 3' 3' 3'   Pulleys      IR (25) 3' 3' 3' 3'          Wall push-ups   10x 2x10   Doorway ER at 60 deg  30\"x3 3x30\" 3x30\"   Roll ball up wall    10x   4-way ball on wall                     Standing FF to 90 deg 1# 2x10  1# 2x10    Standing SCAP to 90 " deg 1# 2x10  1#2x10    S/L ER 1# 2x15 1# 2x15 1# 2x15 1# 2x15   S/L abd to 90 1# 2x15 1# 2x15 1# 2x15 1# 2x15   Serratus punch  1# 2x15 1# 2x20 1# 2x20 2# 2x15   Supine wand FF with weight    3# 2x10   Quadruped: birddogs              Quadruped/plank UE's on rockerboard, M/L motion              Tband Pull downs Green 2x15- HEP Rev     Tband Mid rows Green 2x15-HEP Rev     Tband ER Green 2x10 Rev     Tband IR Green 2x10 Rev            Manual PROM (within precautions), scap mobs 20' 20' 25' 25'          Cold pack PRN          Reassess - 10'    HEP: Access Code: [email or web address]  URL: [email or web address]  Date: 05/09/2025  Prepared by: Emily Mendez    Exercises  - Supine Shoulder Flexion Extension AAROM with Dowel  - 2-3 x daily - 5-7 x weekly - 3 sets - 10 reps - 3 sec hold  - Supine Shoulder Abduction AAROM with Dowel  - 2-3 x daily - 5-7 x weekly - 3 sets - 10 reps  - Supine Shoulder External Rotation with Dowel  - 2-3 x daily - 5-7 x weekly - 3 sets - 10 reps    NEW 6/3/25:  Access Code: [email or web address]  Exercises  - Seated Shoulder Flexion Towel Slide at Table Top  - 2 x daily - 1-2 sets - 10 reps - 3-5 seconds hold  - Seated Shoulder Abduction Towel Slide at Table Top  - 2 x daily - 1-2 sets - 10 reps - 3-5 seconds hold  - Standing Shoulder Internal Rotation Stretch with Dowel (Mirrored)  - 2 x daily - 1-2 sets - 10 reps - 3-5 seconds hold    NEW 6/19/25:  Access Code: [email or web address]  Exercises  - Standing Shoulder Flexion Wall Walk  - 2 x daily - 10 reps - 5-10 hold  - Standing Shoulder Abduction Slides at Wall  - 2 x daily - 10 reps - 5-10 seconds hold  - Standing Shoulder External Rotation Stretch in Doorway  - 2 x daily - 10 reps - 5-10 seconds hold  - Standing Shoulder Internal Rotation Stretch with Dowel (Mirrored)  - 2 x daily - 2 sets - 10 reps - 5-10 seconds hold  AROM: FF, SCAP, ABD    NEW 6/25/25:  Access Code: [email or web address]  URL: [email or web address]  Date: 06/25/2025  Prepared  by: Arnaldo Hoskins    Exercises  - Shoulder External Rotation with Anchored Resistance  - 2 x daily - 2-3 sets - 10 reps  - Shoulder Internal Rotation with Resistance  - 2 x daily - 2-3 sets - 10 reps  - Scapular Retraction with Resistance  - 2 x daily - 2-3 sets - 10 reps  - Scapular Retraction with Resistance Advanced  - 2 x daily - 2-3 sets - 10 reps                                  Goals:  Active       PT Problem       PT Goal 1 (Progressing)       Start:  05/09/25    Expected End:  08/09/25       Pt will increase R UE strength to 4+/5 or greater in order to improve tolerance to functional activities and safely return to work as a .          PT Goal 2 (Progressing)       Start:  05/09/25    Expected End:  08/09/25       Pt will increase R shoulder ROM to WNL in order to reduce pain and improve tolerance to functional mobility and safely RTW as a .          PT Goal 3 (Met)       Start:  05/09/25    Expected End:  08/09/25    Resolved:  07/02/25    Pt will decrease pain to 1/10 at worst, with pt able to manage symptoms with HEP, positioning and modalities independently.          PT Goal 4 (Progressing)       Start:  05/09/25    Expected End:  08/09/25       Pt will be able to teach back 90% of HEP in order to maintain gains made in PT.

## 2025-07-09 ENCOUNTER — TREATMENT (OUTPATIENT)
Dept: PHYSICAL THERAPY | Facility: HOSPITAL | Age: 35
End: 2025-07-09
Payer: MEDICAID

## 2025-07-09 DIAGNOSIS — S43.431A GLENOID LABRAL TEAR, RIGHT, INITIAL ENCOUNTER: ICD-10-CM

## 2025-07-09 DIAGNOSIS — M25.311 INSTABILITY OF RIGHT SHOULDER JOINT: ICD-10-CM

## 2025-07-09 DIAGNOSIS — M25.611 DECREASED RANGE OF MOTION OF RIGHT SHOULDER: ICD-10-CM

## 2025-07-09 PROCEDURE — 97140 MANUAL THERAPY 1/> REGIONS: CPT | Mod: GP,CQ

## 2025-07-09 PROCEDURE — 97110 THERAPEUTIC EXERCISES: CPT | Mod: GP,CQ

## 2025-07-09 ASSESSMENT — PAIN - FUNCTIONAL ASSESSMENT: PAIN_FUNCTIONAL_ASSESSMENT: 0-10

## 2025-07-09 ASSESSMENT — PAIN DESCRIPTION - DESCRIPTORS: DESCRIPTORS: SORE

## 2025-07-09 ASSESSMENT — PAIN SCALES - GENERAL: PAINLEVEL_OUTOF10: 1

## 2025-07-09 NOTE — PROGRESS NOTES
"Physical Therapy    Physical Therapy Treatment    Patient Name: Ruthann Nichole  MRN: [id number]  : 1990  Today's Date: 2025  Time Calculation  Start Time: 1055  Stop Time: 1135  Time Calculation (min): 40 min    PT Therapeutic Procedures Time Entry  Therapeutic Exercise Time Entry: 15  Manual Therapy Time Entry: 25          VISIT:# 19    Current Problem  Problem List Items Addressed This Visit           ICD-10-CM    Instability of right shoulder joint M25.311    Decreased range of motion of right shoulder M25.611     Other Visit Diagnoses         Codes      Glenoid labral tear, right, initial encounter     S43.431A             Subjective    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 She has not had any falls since her last visit.     Pain  Pain Assessment: 0-10  0-10 (Numeric) Pain Score: 1  Pain Location: Shoulder  Pain Orientation: Right  Pain Radiating Towards: R bicep  Pain Descriptors: Sore       Objective    R shoulder FF ROM   AROM: 150*   PROM: 154*       Precautions  Precautions  Precautions Comment: see protocol      Treatments:     EXERCISE 2025    WK 8 = 25        WK 10 = 25 #15/24 #16/24 #17/24 #18 #19                   Pulleys      Flexion to 90 deg 3' 3' 3' 3' 3'   Pulleys      Scaption/Abduction to 45 deg 3' 3' 3' 3' 3'   Pulleys      IR (25) 3' 3' 3' 3' 3'           Wall push-ups   10x 2x10 2x10   Doorway ER at 60 deg  30\"x3 3x30\" 3x30\" 3x30\"H   Roll ball up wall    10x 2x10   4-way ball on wall                        Standing FF to 90 deg 1# 2x10  1# 2x10     Standing SCAP to 90 deg 1# 2x10  1#2x10     S/L ER 1# 2x15 1# 2x15 1# 2x15 1# 2x15 1# 2x15   S/L abd to 90 1# 2x15 1# 2x15 1# 2x15 1# 2x15 1# 2x15   Serratus punch  1# 2x15 1# 2x20 1# 2x20 2# 2x15 2# 2x15   Supine wand FF with weight    3# 2x10 3# 2x10   Quadruped: patricia              "   Quadruped/plank UE's on rockerboard, M/L motion                Tband Pull downs Green 2x15- HEP Rev      Tband Mid rows Green 2x15-HEP Rev      Tband ER Green 2x10 Rev      Tband IR Green 2x10 Rev              Manual PROM (within precautions), scap mobs 20' 20' 25' 25' 25'           Cold pack PRN           Reassess - 10'     HEP: Access Code: [email or web address]  URL: [email or web address]  Date: 05/09/2025  Prepared by: Emily Mendez    Exercises  - Supine Shoulder Flexion Extension AAROM with Dowel  - 2-3 x daily - 5-7 x weekly - 3 sets - 10 reps - 3 sec hold  - Supine Shoulder Abduction AAROM with Dowel  - 2-3 x daily - 5-7 x weekly - 3 sets - 10 reps  - Supine Shoulder External Rotation with Dowel  - 2-3 x daily - 5-7 x weekly - 3 sets - 10 reps    NEW 6/3/25:  Access Code: [email or web address]  Exercises  - Seated Shoulder Flexion Towel Slide at Table Top  - 2 x daily - 1-2 sets - 10 reps - 3-5 seconds hold  - Seated Shoulder Abduction Towel Slide at Table Top  - 2 x daily - 1-2 sets - 10 reps - 3-5 seconds hold  - Standing Shoulder Internal Rotation Stretch with Dowel (Mirrored)  - 2 x daily - 1-2 sets - 10 reps - 3-5 seconds hold    NEW 6/19/25:  Access Code: [email or web address]  Exercises  - Standing Shoulder Flexion Wall Walk  - 2 x daily - 10 reps - 5-10 hold  - Standing Shoulder Abduction Slides at Wall  - 2 x daily - 10 reps - 5-10 seconds hold  - Standing Shoulder External Rotation Stretch in Doorway  - 2 x daily - 10 reps - 5-10 seconds hold  - Standing Shoulder Internal Rotation Stretch with Dowel (Mirrored)  - 2 x daily - 2 sets - 10 reps - 5-10 seconds hold  AROM: FF, SCAP, ABD    NEW 6/25/25:  Access Code: [email or web address]  URL: [email or web address]  Date: 06/25/2025  Prepared by: Arnaldo Hoskins    Exercises  - Shoulder External Rotation with Anchored Resistance  - 2 x daily - 2-3 sets - 10 reps  - Shoulder Internal Rotation with Resistance  - 2 x daily - 2-3 sets - 10 reps  - Scapular  Retraction with Resistance  - 2 x daily - 2-3 sets - 10 reps  - Scapular Retraction with Resistance Advanced  - 2 x daily - 2-3 sets - 10 reps                              Assessment:   At the end of the session pt states that her R shoulder is still sore but she did not have any increased pain. She rated the pain around a 1/10. She felt most of her discomfort with FF motions. Despite some soreness today, she was able to tolerate treatment well. She completed all exercises with minimal difficulty.       Plan:   Continue to progress R shoulder strength and ROM as tolerated by pt per protocol.    OP PT Plan  Treatment/Interventions: Cryotherapy, Education/ Instruction, Manual therapy, Therapeutic activities, Therapeutic exercises, Ultrasound  PT Plan: Skilled PT  PT Frequency: 2 times per week  Duration: 12 weeks  Onset Date: 04/18/25  Certification Period Start Date: 05/09/25  Certification Period End Date: 08/09/25  Rehab Potential: Good  Plan of Care Agreement: Patient    Goals:  Active       PT Problem       PT Goal 1 (Progressing)       Start:  05/09/25    Expected End:  08/09/25       Pt will increase R UE strength to 4+/5 or greater in order to improve tolerance to functional activities and safely return to work as a .          PT Goal 2 (Progressing)       Start:  05/09/25    Expected End:  08/09/25       Pt will increase R shoulder ROM to WNL in order to reduce pain and improve tolerance to functional mobility and safely RTW as a .          PT Goal 3 (Met)       Start:  05/09/25    Expected End:  08/09/25    Resolved:  07/02/25    Pt will decrease pain to 1/10 at worst, with pt able to manage symptoms with HEP, positioning and modalities independently.          PT Goal 4 (Progressing)       Start:  05/09/25    Expected End:  08/09/25       Pt will be able to teach back 90% of HEP in order to maintain gains made in PT.

## 2025-07-10 LAB
CYTOLOGY CMNT CVX/VAG CYTO-IMP: NORMAL
HPV HR 12 DNA GENITAL QL NAA+PROBE: NEGATIVE
HPV HR GENOTYPES PNL CVX NAA+PROBE: NEGATIVE
HPV16 DNA SPEC QL NAA+PROBE: NEGATIVE
HPV18 DNA SPEC QL NAA+PROBE: NEGATIVE
LAB AP HPV GENOTYPE QUESTION: YES
LAB AP HPV HR: NORMAL
LABORATORY COMMENT REPORT: NORMAL
PATH REPORT.TOTAL CANCER: NORMAL

## 2025-07-14 ENCOUNTER — TREATMENT (OUTPATIENT)
Dept: PHYSICAL THERAPY | Facility: HOSPITAL | Age: 35
End: 2025-07-14
Payer: MEDICAID

## 2025-07-14 DIAGNOSIS — M25.611 DECREASED RANGE OF MOTION OF RIGHT SHOULDER: ICD-10-CM

## 2025-07-14 DIAGNOSIS — S43.431D GLENOID LABRAL TEAR, RIGHT, SUBSEQUENT ENCOUNTER: Primary | ICD-10-CM

## 2025-07-14 DIAGNOSIS — M25.311 INSTABILITY OF RIGHT SHOULDER JOINT: ICD-10-CM

## 2025-07-14 DIAGNOSIS — S43.431A GLENOID LABRAL TEAR, RIGHT, INITIAL ENCOUNTER: ICD-10-CM

## 2025-07-14 PROCEDURE — 97140 MANUAL THERAPY 1/> REGIONS: CPT | Mod: GP,CQ

## 2025-07-14 PROCEDURE — 97110 THERAPEUTIC EXERCISES: CPT | Mod: GP,CQ

## 2025-07-14 ASSESSMENT — PAIN SCALES - GENERAL: PAINLEVEL_OUTOF10: 0 - NO PAIN

## 2025-07-14 ASSESSMENT — PAIN - FUNCTIONAL ASSESSMENT: PAIN_FUNCTIONAL_ASSESSMENT: 0-10

## 2025-07-14 NOTE — PROGRESS NOTES
"Physical Therapy    Physical Therapy Treatment    Patient Name: Ruthann Nichole  MRN: [id number]  : 1990   Today's Date: 2025  Time Calculation  Start Time: 0900  Stop Time: 09  Time Calculation (min): 43 min     PT Therapeutic Procedures Time Entry  Therapeutic Exercise Time Entry: 33  Manual Therapy Time Entry: 10                 Visit Number: 20/24  Auth Dates: 2025-2025    Current Problem  Problem List Items Addressed This Visit           ICD-10-CM    Glenoid labral tear, right, subsequent encounter - Primary S43.431D    Instability of right shoulder joint M25.311    Decreased range of motion of right shoulder M25.611        Subjective   Pt reports compliance with HEP and no recent falls. 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, she is hoping to be cleared to return to riding at her next follow up with the    Precautions  Precautions  Precautions Comment: see protocol    Pain  Pain Assessment: 0-10  0-10 (Numeric) Pain Score: 0 - No pain  Pain Location: Shoulder  Pain Orientation: Right      Objective      Treatments:     EXERCISE 2025   WK 8 = 25        WK 10 = 25 #16/24 #17/ #18/ #19 #        12weeks            Pulleys      Flexion to 90 deg 3' 3' 3' 3' 3min full available range   Pulleys      Scaption/Abduction to 45 deg 3' 3' 3' 3' 3min full available range   Pulleys      IR (25) 3' 3' 3' 3' 3min    UE Bike      5min   Wall push-ups  10x 2x10 2x10 2x10   Doorway ER at 60 deg 30\"x3 3x30\" 3x30\" 3x30\"H [id number]    Roll ball up wall   10x 2x10    4-way ball on wall     2x10ea    Body blade 90/90 A/P & M/L     30sec x2ea            Standing FF to 90 deg  1# 2x10   1# x10   Standing SCAP to 90 deg  1#2x10   1# x10   Standing abd to 90 deg     1# x10   3 way bicep curl      1# x10ea            S/L ER 1# 2x15 1# 2x15 1# 2x15 1# 2x15 1# 2x15   S/L abd to 90 1# 2x15 1# 2x15 1# 2x15 1# 2x15 1# 2x15   Serratus " punch  1# 2x20 1# 2x20 2# 2x15 2# 2x15 2# 2x15   Supine wand FF with weight   3# 2x10 3# 2x10    Quadruped: birddogs     X10 Yang            Quadruped/plank UE's on rockerboard, M/L motion                Tband Pull downs Rev       Tband Mid rows Rev       Tband ER Rev       Tband IR Rev               Manual PROM (within precautions), scap mobs 20' 25' 25' 25' 10min           Cold pack PRN          Reassess - 10'      HEP: Access Code: [email or web address]  Exercises  - Supine Shoulder Flexion Extension AAROM with Dowel  - 2-3 x daily - 5-7 x weekly - 3 sets - 10 reps - 3 sec hold  - Supine Shoulder Abduction AAROM with Dowel  - 2-3 x daily - 5-7 x weekly - 3 sets - 10 reps  - Supine Shoulder External Rotation with Dowel  - 2-3 x daily - 5-7 x weekly - 3 sets - 10 reps  NEW 6/3/25:  Access Code: [email or web address]  Exercises  - Seated Shoulder Flexion Towel Slide at Table Top  - 2 x daily - 1-2 sets - 10 reps - 3-5 seconds hold  - Seated Shoulder Abduction Towel Slide at Table Top  - 2 x daily - 1-2 sets - 10 reps - 3-5 seconds hold  - Standing Shoulder Internal Rotation Stretch with Dowel (Mirrored)  - 2 x daily - 1-2 sets - 10 reps - 3-5 seconds hold  NEW 6/19/25:  Access Code: [email or web address]  Exercises  - Standing Shoulder Flexion Wall Walk  - 2 x daily - 10 reps - 5-10 hold  - Standing Shoulder Abduction Slides at Wall  - 2 x daily - 10 reps - 5-10 seconds hold  - Standing Shoulder External Rotation Stretch in Doorway  - 2 x daily - 10 reps - 5-10 seconds hold  - Standing Shoulder Internal Rotation Stretch with Dowel (Mirrored)  - 2 x daily - 2 sets - 10 reps - 5-10 seconds hold  AROM: FF, SCAP, ABD  NEW 6/25/25:  Access Code: [email or web address]  Exercises  - Shoulder External Rotation with Anchored Resistance  - 2 x daily - 2-3 sets - 10 reps  - Shoulder Internal Rotation with Resistance  - 2 x daily - 2-3 sets - 10 reps  - Scapular Retraction with Resistance  - 2 x daily - 2-3 sets - 10 reps  - Scapular Retraction with Resistance Advanced  - 2 x  daily - 2-3 sets - 10 reps          Assessment:   Pt tolerated all exercises well with minimal difficulty reporting no pain at end of session.     Plan:  Continue to improve R shoulder strength as tolerated to return to PLOF.   OP PT Plan  Treatment/Interventions: Cryotherapy, Education/ Instruction, Manual therapy, Therapeutic activities, Therapeutic exercises, Ultrasound  PT Plan: Skilled PT  PT Frequency: 2 times per week  Duration: 12 weeks  Onset Date: 04/18/25  Certification Period Start Date: 05/09/25  Certification Period End Date: 08/09/25  Rehab Potential: Good  Plan of Care Agreement: Patient    Goals:  Active       PT Problem       PT Goal 1 (Progressing)       Start:  05/09/25    Expected End:  08/09/25       Pt will increase R UE strength to 4+/5 or greater in order to improve tolerance to functional activities and safely return to work as a .          PT Goal 2 (Progressing)       Start:  05/09/25    Expected End:  08/09/25       Pt will increase R shoulder ROM to WNL in order to reduce pain and improve tolerance to functional mobility and safely RTW as a .          PT Goal 3 (Met)       Start:  05/09/25    Expected End:  08/09/25    Resolved:  07/02/25    Pt will decrease pain to 1/10 at worst, with pt able to manage symptoms with HEP, positioning and modalities independently.          PT Goal 4 (Progressing)       Start:  05/09/25    Expected End:  08/09/25       Pt will be able to teach back 90% of HEP in order to maintain gains made in PT.

## 2025-07-15 ENCOUNTER — APPOINTMENT (OUTPATIENT)
Dept: PRIMARY CARE | Facility: CLINIC | Age: 35
End: 2025-07-15
Payer: MEDICAID

## 2025-07-16 ENCOUNTER — APPOINTMENT (OUTPATIENT)
Dept: ORTHOPEDIC SURGERY | Age: 35
End: 2025-07-16
Payer: MEDICAID

## 2025-07-17 ENCOUNTER — TREATMENT (OUTPATIENT)
Dept: PHYSICAL THERAPY | Facility: HOSPITAL | Age: 35
End: 2025-07-17
Payer: MEDICAID

## 2025-07-17 DIAGNOSIS — M25.311 INSTABILITY OF RIGHT SHOULDER JOINT: ICD-10-CM

## 2025-07-17 DIAGNOSIS — S43.431A GLENOID LABRAL TEAR, RIGHT, INITIAL ENCOUNTER: ICD-10-CM

## 2025-07-17 DIAGNOSIS — M25.611 DECREASED RANGE OF MOTION OF RIGHT SHOULDER: ICD-10-CM

## 2025-07-17 PROCEDURE — 97140 MANUAL THERAPY 1/> REGIONS: CPT | Mod: GP | Performed by: PHYSICAL THERAPIST

## 2025-07-17 PROCEDURE — 97110 THERAPEUTIC EXERCISES: CPT | Mod: GP | Performed by: PHYSICAL THERAPIST

## 2025-07-17 ASSESSMENT — PAIN SCALES - GENERAL: PAINLEVEL_OUTOF10: 0 - NO PAIN

## 2025-07-17 NOTE — PROGRESS NOTES
"Physical Therapy    Physical Therapy Treatment    Patient Name: Ruthann Nichole  MRN: [id number]  : 1990   Today's Date: 2025  Time Calculation  Start Time: 900  Stop Time: 945  Time Calculation (min): 45 min       PT Therapeutic Procedures Time Entry  Therapeutic Exercise Time Entry: 35  Manual Therapy Time Entry: 10          Visit #21    Assessment:   Advanced strengthening adding D2 and D1 patterns with T-band and stabilization with bodyblade at varying angles, all challenging to pt.     Pt reports 0/10 pain after treatment.    Plan: Advance strengthening and stabilization.   OP PT Plan  Treatment/Interventions: Cryotherapy, Education/ Instruction, Manual therapy, Therapeutic activities, Therapeutic exercises, Ultrasound  PT Plan: Skilled PT  PT Frequency: 2 times per week  Duration: 12 weeks  Onset Date: 25  Certification Period Start Date: 25  Certification Period End Date: 25  Rehab Potential: Good  Plan of Care Agreement: Patient    Current Problem  1. Decreased range of motion of right shoulder        2. Glenoid labral tear, right, initial encounter  Follow Up In Physical Therapy      3. Instability of right shoulder joint  Follow Up In Physical Therapy          Subjective   General  Pt states she has been using the right UE for more things. Only sore in the mornings. Hasn't done HEP last two days from being out of town.    Precautions  Precautions  Precautions Comment: see protocol    Pain  0-10 (Numeric) Pain Score: 0 - No pain    Objective   PROM:    deg           Treatments:     EXERCISE 2025    WK 8 = 25       WK 10 = 25 #18 # # #      12weeks            Pulleys      Flexion to 90 deg 3' 3' 3min full available range 3'   Pulleys      Scaption/Abduction to 45 deg 3' 3' 3min full available range 3'   Pulleys      IR (25) 3' 3' 3min  3'          UE Bike    5min 5' (1\" BilUE, 4\" R UE)          Wall push-ups " "2x10 2x10 2x10           Doorway ER at 60 deg 3x30\" 3x30\"H [id number]  3x30\"   Roll ball up wall 10x 2x10     4-way ball on wall   2x10ea  2x20 ea   Body blade: 90/90 A/P & M/L   30sec x2ea  30\"x2 M/L only   90 deg FF    15\"x2   90/90 ER    10\"x2          Theraband:       D2    Yellow 2x10   D1    Yellow 2x10          Standing FF to 90 deg   1# x10    Standing SCAP to 90 deg   1# x10    Standing abd to 90 deg   1# x10    3 way bicep curl    1# x10ea            S/L ER 1# 2x15 1# 2x15 1# 2x15    S/L abd to 90 1# 2x15 1# 2x15 1# 2x15    Serratus punch  2# 2x15 2# 2x15 2# 2x15    Supine wand FF with weight 3# 2x10 3# 2x10     Quadruped: birddogs   X10 Yang  10x yang          Quadruped/plank UE's on rockerboard, M/L motion                     Manual PROM (within precautions), scap mobs 25' 25' 10min 10'          Cold pack PRN              HEP: Access Code: [email or web address]  Exercises  - Supine Shoulder Flexion Extension AAROM with Dowel  - 2-3 x daily - 5-7 x weekly - 3 sets - 10 reps - 3 sec hold  - Supine Shoulder Abduction AAROM with Dowel  - 2-3 x daily - 5-7 x weekly - 3 sets - 10 reps  - Supine Shoulder External Rotation with Dowel  - 2-3 x daily - 5-7 x weekly - 3 sets - 10 reps  NEW 6/3/25:  Access Code: [email or web address]  Exercises  - Seated Shoulder Flexion Towel Slide at Table Top  - 2 x daily - 1-2 sets - 10 reps - 3-5 seconds hold  - Seated Shoulder Abduction Towel Slide at Table Top  - 2 x daily - 1-2 sets - 10 reps - 3-5 seconds hold  - Standing Shoulder Internal Rotation Stretch with Dowel (Mirrored)  - 2 x daily - 1-2 sets - 10 reps - 3-5 seconds hold  NEW 6/19/25:  Access Code: [email or web address]  Exercises  - Standing Shoulder Flexion Wall Walk  - 2 x daily - 10 reps - 5-10 hold  - Standing Shoulder Abduction Slides at Wall  - 2 x daily - 10 reps - 5-10 seconds hold  - Standing Shoulder External Rotation Stretch in Doorway  - 2 x daily - 10 reps - 5-10 seconds hold  - Standing Shoulder Internal Rotation Stretch with Dowel (Mirrored)  " - 2 x daily - 2 sets - 10 reps - 5-10 seconds hold  AROM: FF, SCAP, ABD  NEW 6/25/25:  Access Code: [email or web address]  Exercises  - Shoulder External Rotation with Anchored Resistance  - 2 x daily - 2-3 sets - 10 reps  - Shoulder Internal Rotation with Resistance  - 2 x daily - 2-3 sets - 10 reps  - Scapular Retraction with Resistance  - 2 x daily - 2-3 sets - 10 reps  - Scapular Retraction with Resistance Advanced  - 2 x daily - 2-3 sets - 10 reps                                  Goals:  Active       PT Problem       PT Goal 1 (Progressing)       Start:  05/09/25    Expected End:  08/09/25       Pt will increase R UE strength to 4+/5 or greater in order to improve tolerance to functional activities and safely return to work as a .          PT Goal 2 (Progressing)       Start:  05/09/25    Expected End:  08/09/25       Pt will increase R shoulder ROM to WNL in order to reduce pain and improve tolerance to functional mobility and safely RTW as a .          PT Goal 3 (Met)       Start:  05/09/25    Expected End:  08/09/25    Resolved:  07/02/25    Pt will decrease pain to 1/10 at worst, with pt able to manage symptoms with HEP, positioning and modalities independently.          PT Goal 4 (Progressing)       Start:  05/09/25    Expected End:  08/09/25       Pt will be able to teach back 90% of HEP in order to maintain gains made in PT.

## 2025-07-21 ENCOUNTER — APPOINTMENT (OUTPATIENT)
Dept: ORTHOPEDIC SURGERY | Facility: CLINIC | Age: 35
End: 2025-07-21
Payer: MEDICAID

## 2025-07-23 ENCOUNTER — TREATMENT (OUTPATIENT)
Dept: PHYSICAL THERAPY | Facility: HOSPITAL | Age: 35
End: 2025-07-23
Payer: MEDICAID

## 2025-07-23 DIAGNOSIS — M25.311 INSTABILITY OF RIGHT SHOULDER JOINT: ICD-10-CM

## 2025-07-23 DIAGNOSIS — M25.611 DECREASED RANGE OF MOTION OF RIGHT SHOULDER: ICD-10-CM

## 2025-07-23 DIAGNOSIS — S43.431A GLENOID LABRAL TEAR, RIGHT, INITIAL ENCOUNTER: ICD-10-CM

## 2025-07-23 PROCEDURE — 97110 THERAPEUTIC EXERCISES: CPT | Mod: GP | Performed by: PHYSICAL THERAPIST

## 2025-07-23 PROCEDURE — 97140 MANUAL THERAPY 1/> REGIONS: CPT | Mod: GP | Performed by: PHYSICAL THERAPIST

## 2025-07-23 ASSESSMENT — PAIN SCALES - GENERAL: PAINLEVEL_OUTOF10: 0 - NO PAIN

## 2025-07-23 NOTE — PROGRESS NOTES
"Physical Therapy    Physical Therapy Treatment    Patient Name: Ruthann Nichole  MRN: [id number]  : 1990   Today's Date: 2025  Time Calculation  Start Time: 945  Stop Time:   Time Calculation (min): 45 min       PT Therapeutic Procedures Time Entry  Therapeutic Exercise Time Entry: 25  Manual Therapy Time Entry: 20                   Visit #22    Assessment:   Added prone exercises for scap/RC strengthening with prone ER given OP at end-range for ROM. She had some increased c/o pain at end-ranges of PROM today, possibly from a minor injury she describes sustaining over the weekend.    Pt reports 0/10 pain after treatment.    Plan: Two visits then pt will be discharged to HEP.  OP PT Plan  Treatment/Interventions: Cryotherapy, Education/ Instruction, Manual therapy, Therapeutic activities, Therapeutic exercises, Ultrasound  PT Plan: Skilled PT  PT Frequency: 2 times per week  Duration: 12 weeks  Onset Date: 25  Certification Period Start Date: 25  Certification Period End Date: 25  Rehab Potential: Good  Plan of Care Agreement: Patient    Current Problem  1. Decreased range of motion of right shoulder  Follow Up In Physical Therapy      2. Glenoid labral tear, right, initial encounter  Follow Up In Physical Therapy      3. Instability of right shoulder joint  Follow Up In Physical Therapy          Subjective   General  No new complaints, working on HEP.    Precautions  Precautions  Precautions Comment: see protocol    Pain  0-10 (Numeric) Pain Score: 0 - No pain    Objective                   Treatments:     EXERCISE 2025    WK 8 = 25       WK 10 = 25 #19/24 #20 #21 #22     12weeks             Pulleys      Flexion to 90 deg 3' 3min full available range 3' 3'   Pulleys      Scaption/Abduction to 45 deg 3' 3min full available range 3' 3'   Pulleys      IR (25) 3' 3min  3' 3'          UE Bike   5min 5' (1\" BilUE, 4\" R UE)         " "  Wall push-ups 2x10 2x10            Doorway ER at 60 deg 3x30\"H [id number]  3x30\"    Roll ball up wall 2x10      4-way ball on wall  2x10ea  2x20 ea           Body blade: 90/90 A/P & M/L  30sec x2ea  30\"x2 M/L only 30\"x2 M/L only   90 deg FF   15\"x2    90/90 ER   10\"x2           Theraband:       D2   Yellow 2x10 Yellow 2x10   D1   Yellow 2x10 Yellow 2x10   ER at 90-90    Yellow 10x          Standing FF to 90 deg  1# x10     Standing SCAP to 90 deg  1# x10     Standing abd to 90 deg  1# x10     3 way bicep curl   1# x10ea             S/L ER 1# 2x15 1# 2x15     S/L abd to 90 1# 2x15 1# 2x15     Serratus punch  2# 2x15 2# 2x15     Supine wand FF with weight 3# 2x10      Quadruped: birddogs  X10 Yang  10x ea 10x ea          Prone:       T's thumb forward/up    10x ea   ER    10x with A/OP          Quadruped/plank UE's on rockerboard, M/L motion                     Manual PROM (within precautions), scap mobs 25' 10min 10' 20'          Cold pack PRN              HEP: Access Code: [email or web address]  Exercises  - Supine Shoulder Flexion Extension AAROM with Dowel  - 2-3 x daily - 5-7 x weekly - 3 sets - 10 reps - 3 sec hold  - Supine Shoulder Abduction AAROM with Dowel  - 2-3 x daily - 5-7 x weekly - 3 sets - 10 reps  - Supine Shoulder External Rotation with Dowel  - 2-3 x daily - 5-7 x weekly - 3 sets - 10 reps  NEW 6/3/25:  Access Code: [email or web address]  Exercises  - Seated Shoulder Flexion Towel Slide at Table Top  - 2 x daily - 1-2 sets - 10 reps - 3-5 seconds hold  - Seated Shoulder Abduction Towel Slide at Table Top  - 2 x daily - 1-2 sets - 10 reps - 3-5 seconds hold  - Standing Shoulder Internal Rotation Stretch with Dowel (Mirrored)  - 2 x daily - 1-2 sets - 10 reps - 3-5 seconds hold  NEW 6/19/25:  Access Code: [email or web address]  Exercises  - Standing Shoulder Flexion Wall Walk  - 2 x daily - 10 reps - 5-10 hold  - Standing Shoulder Abduction Slides at Wall  - 2 x daily - 10 reps - 5-10 seconds hold  - Standing Shoulder External Rotation " Stretch in Doorway  - 2 x daily - 10 reps - 5-10 seconds hold  - Standing Shoulder Internal Rotation Stretch with Dowel (Mirrored)  - 2 x daily - 2 sets - 10 reps - 5-10 seconds hold  AROM: FF, SCAP, ABD  NEW 6/25/25:  Access Code: [email or web address]  Exercises  - Shoulder External Rotation with Anchored Resistance  - 2 x daily - 2-3 sets - 10 reps  - Shoulder Internal Rotation with Resistance  - 2 x daily - 2-3 sets - 10 reps  - Scapular Retraction with Resistance  - 2 x daily - 2-3 sets - 10 reps  - Scapular Retraction with Resistance Advanced  - 2 x daily - 2-3 sets - 10 reps                                  Goals:  Active       PT Problem       PT Goal 1 (Progressing)       Start:  05/09/25    Expected End:  08/09/25       Pt will increase R UE strength to 4+/5 or greater in order to improve tolerance to functional activities and safely return to work as a .          PT Goal 2 (Progressing)       Start:  05/09/25    Expected End:  08/09/25       Pt will increase R shoulder ROM to WNL in order to reduce pain and improve tolerance to functional mobility and safely RTW as a .          PT Goal 3 (Met)       Start:  05/09/25    Expected End:  08/09/25    Resolved:  07/02/25    Pt will decrease pain to 1/10 at worst, with pt able to manage symptoms with HEP, positioning and modalities independently.          PT Goal 4 (Progressing)       Start:  05/09/25    Expected End:  08/09/25       Pt will be able to teach back 90% of HEP in order to maintain gains made in PT.

## 2025-07-25 ENCOUNTER — TREATMENT (OUTPATIENT)
Dept: PHYSICAL THERAPY | Facility: HOSPITAL | Age: 35
End: 2025-07-25
Payer: MEDICAID

## 2025-07-25 DIAGNOSIS — S43.431A GLENOID LABRAL TEAR, RIGHT, INITIAL ENCOUNTER: ICD-10-CM

## 2025-07-25 DIAGNOSIS — M25.611 DECREASED RANGE OF MOTION OF RIGHT SHOULDER: ICD-10-CM

## 2025-07-25 DIAGNOSIS — M25.311 INSTABILITY OF RIGHT SHOULDER JOINT: ICD-10-CM

## 2025-07-25 PROCEDURE — 97140 MANUAL THERAPY 1/> REGIONS: CPT | Mod: GP,CQ

## 2025-07-25 PROCEDURE — 97110 THERAPEUTIC EXERCISES: CPT | Mod: GP,CQ

## 2025-07-25 ASSESSMENT — PAIN SCALES - GENERAL: PAINLEVEL_OUTOF10: 0 - NO PAIN

## 2025-07-25 ASSESSMENT — PAIN - FUNCTIONAL ASSESSMENT: PAIN_FUNCTIONAL_ASSESSMENT: 0-10

## 2025-07-25 NOTE — PROGRESS NOTES
"Physical Therapy    Physical Therapy Treatment    Patient Name: Ruthann Nichole  MRN: [id number]  : 1990  Today's Date: 2025  Time Calculation  Start Time: 1145  Stop Time: 1225  Time Calculation (min): 40 min    PT Therapeutic Procedures Time Entry  Therapeutic Exercise Time Entry: 30  Manual Therapy Time Entry: 10          VISIT:# 23    Current Problem  Problem List Items Addressed This Visit           ICD-10-CM    Instability of right shoulder joint M25.311    Decreased range of motion of right shoulder M25.611     Other Visit Diagnoses         Codes      Glenoid labral tear, right, initial encounter     S43.431A             Subjective    Pt has no pain or falls reported     Pain  Pain Assessment: 0-10  0-10 (Numeric) Pain Score: 0 - No pain          Objective    Patient is independent with current HEP.               Precautions  Precautions  STEADI Fall Risk Score (The score of 4 or more indicates an increased risk of falling): unchanged  Precautions Comment: see protocol      Treatments:     EXERCISE 2025    WK 8 = 25        WK 10 = 25 #19/24 #20 #21/ #22/ #23/     12weeks               Pulleys      Flexion to 90 deg 3' 3min full available range 3' 3' 3'   Pulleys      Scaption/Abduction to 45 deg 3' 3min full available range 3' 3' 3'   Pulleys      IR (25) 3' 3min  3' 3' 3'           UE Bike   5min 5' (1\" BilUE, 4\" R UE)  5'           Wall push-ups 2x10 2x10              Doorway ER at 60 deg 3x30\"H 9x16pbe  3x30\"     Roll ball up wall 2x10       4-way ball on wall  2x10ea  2x20 ea             Body blade: 90/90 A/P & M/L  30sec x2ea  30\"x2 M/L only 30\"x2 M/L only 30\"x2 M/L only   90 deg FF   15\"x2     90/90 ER   10\"x2             Theraband:        D2   Yellow 2x10 Yellow 2x10 Red 10 x 2    D1   Yellow 2x10 Yellow 2x10 Red 10 x 2    ER at 90-90    Yellow 10x Red 10 x 2            Standing FF to 90 deg  1# x10      Standing SCAP to 90 " deg  1# x10      Standing abd to 90 deg  1# x10      3 way bicep curl   1# x10ea               S/L ER 1# 2x15 1# 2x15      S/L abd to 90 1# 2x15 1# 2x15      Serratus punch  2# 2x15 2# 2x15      Supine wand FF with weight 3# 2x10       Quadruped: birddogs  X10 Yang  10x ea 10x ea 10x ea           Prone:        T's thumb forward/up    10x ea 10x ea   ER    10x with A/OP Low Y 10 x            Quadruped/plank UE's on rockerboard, M/L motion                        Manual PROM (within precautions), scap mobs 25' 10min 10' 20' 10'           Cold pack PRN                HEP: Access Code: [email or web address]  Exercises  - Supine Shoulder Flexion Extension AAROM with Dowel  - 2-3 x daily - 5-7 x weekly - 3 sets - 10 reps - 3 sec hold  - Supine Shoulder Abduction AAROM with Dowel  - 2-3 x daily - 5-7 x weekly - 3 sets - 10 reps  - Supine Shoulder External Rotation with Dowel  - 2-3 x daily - 5-7 x weekly - 3 sets - 10 reps  NEW 6/3/25:  Access Code: [email or web address]  Exercises  - Seated Shoulder Flexion Towel Slide at Table Top  - 2 x daily - 1-2 sets - 10 reps - 3-5 seconds hold  - Seated Shoulder Abduction Towel Slide at Table Top  - 2 x daily - 1-2 sets - 10 reps - 3-5 seconds hold  - Standing Shoulder Internal Rotation Stretch with Dowel (Mirrored)  - 2 x daily - 1-2 sets - 10 reps - 3-5 seconds hold  NEW 6/19/25:  Access Code: [email or web address]  Exercises  - Standing Shoulder Flexion Wall Walk  - 2 x daily - 10 reps - 5-10 hold  - Standing Shoulder Abduction Slides at Wall  - 2 x daily - 10 reps - 5-10 seconds hold  - Standing Shoulder External Rotation Stretch in Doorway  - 2 x daily - 10 reps - 5-10 seconds hold  - Standing Shoulder Internal Rotation Stretch with Dowel (Mirrored)  - 2 x daily - 2 sets - 10 reps - 5-10 seconds hold  AROM: FF, SCAP, ABD  NEW 6/25/25:  Access Code: [email or web address]  Exercises  - Shoulder External Rotation with Anchored Resistance  - 2 x daily - 2-3 sets - 10 reps  - Shoulder Internal Rotation with Resistance  - 2 x daily -  2-3 sets - 10 reps  - Scapular Retraction with Resistance  - 2 x daily - 2-3 sets - 10 reps  - Scapular Retraction with Resistance Advanced  - 2 x daily - 2-3 sets - 10 reps                                  Assessment:   Pt tolerated treatment without complaint of increase in symptoms.  Pt was able to transition from yellow tband to red tband without pain on this day        Plan: Recheck next session  OP PT Plan  Treatment/Interventions: Cryotherapy, Education/ Instruction, Manual therapy, Therapeutic activities, Therapeutic exercises, Ultrasound  PT Plan: Skilled PT  PT Frequency: 2 times per week  Duration: 12 weeks  Onset Date: 04/18/25  Certification Period Start Date: 05/09/25  Certification Period End Date: 08/09/25  Rehab Potential: Good  Plan of Care Agreement: Patient    Goals:  Active       PT Problem       PT Goal 1 (Progressing)       Start:  05/09/25    Expected End:  08/09/25       Pt will increase R UE strength to 4+/5 or greater in order to improve tolerance to functional activities and safely return to work as a .          PT Goal 2 (Progressing)       Start:  05/09/25    Expected End:  08/09/25       Pt will increase R shoulder ROM to WNL in order to reduce pain and improve tolerance to functional mobility and safely RTW as a .          PT Goal 3 (Met)       Start:  05/09/25    Expected End:  08/09/25    Resolved:  07/02/25    Pt will decrease pain to 1/10 at worst, with pt able to manage symptoms with HEP, positioning and modalities independently.          PT Goal 4 (Progressing)       Start:  05/09/25    Expected End:  08/09/25       Pt will be able to teach back 90% of HEP in order to maintain gains made in PT.

## 2025-07-28 ENCOUNTER — TREATMENT (OUTPATIENT)
Dept: PHYSICAL THERAPY | Facility: HOSPITAL | Age: 35
End: 2025-07-28
Payer: MEDICAID

## 2025-07-28 DIAGNOSIS — M25.611 DECREASED RANGE OF MOTION OF RIGHT SHOULDER: ICD-10-CM

## 2025-07-28 DIAGNOSIS — S43.431A GLENOID LABRAL TEAR, RIGHT, INITIAL ENCOUNTER: ICD-10-CM

## 2025-07-28 DIAGNOSIS — M25.311 INSTABILITY OF RIGHT SHOULDER JOINT: ICD-10-CM

## 2025-07-28 PROCEDURE — 97110 THERAPEUTIC EXERCISES: CPT | Mod: GP | Performed by: PHYSICAL THERAPIST

## 2025-07-28 ASSESSMENT — PAIN SCALES - GENERAL: PAINLEVEL_OUTOF10: 0 - NO PAIN

## 2025-07-28 NOTE — PROGRESS NOTES
Physical Therapy    Physical Therapy Treatment / Discharge    Patient Name: Ruthann Nichole  MRN: [id number]  : 1990   Today's Date: 2025  Time Calculation  Start Time: 945  Stop Time: 0  Time Calculation (min): 45 min       PT Therapeutic Procedures Time Entry  Therapeutic Exercise Time Entry: 45         Visit #24    Assessment:   Pt with gains in motion and strength since last reassessment. She has exhausted her insurance authorization and has met maximum benefit from PT. She has an advanced HEP for continued work on shoulder ROM, strength and stabilization. She was able to demonstrate understanding and is amenable to continuing independently with her HEP.    Pt reports 0/10 pain after treatment.    Plan:  Discharge to independent HEP.  OP PT Plan  Treatment/Interventions: Cryotherapy, Education/ Instruction, Manual therapy, Therapeutic activities, Therapeutic exercises, Ultrasound  PT Plan: Skilled PT  PT Frequency: 2 times per week  Duration: 12 weeks  Onset Date: 25  Certification Period Start Date: 25  Certification Period End Date: 25  Rehab Potential: Good  Plan of Care Agreement: Patient    Current Problem  1. Decreased range of motion of right shoulder  Follow Up In Physical Therapy      2. Glenoid labral tear, right, initial encounter  Follow Up In Physical Therapy      3. Instability of right shoulder joint  Follow Up In Physical Therapy          Subjective   General  Pt states she has some soreness after sleeping on her right side, with stretching and with trying to throw. She feels her biggest limitation is lifting overhead.     Precautions  Precautions  Precautions Comment: see protocol    Pain  0-10 (Numeric) Pain Score: 0 - No pain    Objective   AROM  FF: 120 deg  ABD: 97 deg  IR: to T7      PROM  FF: 144 deg  ABD: 125 deg  ER: 60 deg    Strength:  FF, ABD: 4-/5  ER: 4/5  IR: 5/5  Elbow FLEX: 5/5  Elbow EXT: 4+/5      Other Measures  Disability of Arm Shoulder Hand  "(DASH): 18 (improved from 28)  Treatments:     EXERCISE 7/17/2025 7/23/2025 7/25/2025 7/28/2025    WK 8 = 6/13/25       WK 10 = 6/27/25 #21/24 #22/24 #23/24 #24/24                 Pulleys      Flexion to 90 deg 3' 3' 3'    Pulleys      Scaption/Abduction to 45 deg 3' 3' 3'    Pulleys      IR (5/30/25) 3' 3' 3'           UE Bike  5' (1\" BilUE, 4\" R UE)  5'           Wall push-ups              Doorway ER at 60 deg 3x30\"      Roll ball up wall       4-way ball on wall 2x20 ea             Body blade: 90/90 A/P & M/L 30\"x2 M/L only 30\"x2 M/L only 30\"x2 M/L only    90 deg FF 15\"x2      90/90 ER 10\"x2             Theraband:       D2 Yellow 2x10 Yellow 2x10 Red 10 x 2     D1 Yellow 2x10 Yellow 2x10 Red 10 x 2     ER at 90-90  Yellow 10x Red 10 x 2            Standing FF to 90 deg       Standing SCAP to 90 deg       Standing abd to 90 deg       3 way bicep curl               S/L ER       S/L abd to 90       Serratus punch        Supine wand FF with weight       Quadruped: birddogs 10x ea 10x ea 10x ea           Prone:       T's thumb forward/up  10x ea 10x ea    ER  10x with A/OP Low Y 10 x            Quadruped/plank UE's on rockerboard, M/L motion                     Manual PROM (within precautions), scap mobs 10' 20' 10'           Cold pack PRN           Reassess and review/progress HEP - 45 min    HEP: Access Code: [email or web address]  Exercises  - Supine Shoulder Flexion Extension AAROM with Dowel  - 2-3 x daily - 5-7 x weekly - 3 sets - 10 reps - 3 sec hold  - Supine Shoulder Abduction AAROM with Dowel  - 2-3 x daily - 5-7 x weekly - 3 sets - 10 reps  - Supine Shoulder External Rotation with Dowel  - 2-3 x daily - 5-7 x weekly - 3 sets - 10 reps  NEW 6/3/25:  Access Code: [email or web address]  Exercises  - Seated Shoulder Flexion Towel Slide at Table Top  - 2 x daily - 1-2 sets - 10 reps - 3-5 seconds hold  - Seated Shoulder Abduction Towel Slide at Table Top  - 2 x daily - 1-2 sets - 10 reps - 3-5 seconds hold  - Standing Shoulder " Internal Rotation Stretch with Dowel (Mirrored)  - 2 x daily - 1-2 sets - 10 reps - 3-5 seconds hold  NEW 6/19/25:  Access Code: [email or web address]  Exercises  - Standing Shoulder Flexion Wall Walk  - 2 x daily - 10 reps - 5-10 hold  - Standing Shoulder Abduction Slides at Wall  - 2 x daily - 10 reps - 5-10 seconds hold  - Standing Shoulder External Rotation Stretch in Doorway  - 2 x daily - 10 reps - 5-10 seconds hold  - Standing Shoulder Internal Rotation Stretch with Dowel (Mirrored)  - 2 x daily - 2 sets - 10 reps - 5-10 seconds hold  AROM: FF, SCAP, ABD  NEW 6/25/25:  Access Code: [email or web address]  Exercises  - Shoulder External Rotation with Anchored Resistance  - 2 x daily - 2-3 sets - 10 reps  - Shoulder Internal Rotation with Resistance  - 2 x daily - 2-3 sets - 10 reps  - Scapular Retraction with Resistance  - 2 x daily - 2-3 sets - 10 reps  - Scapular Retraction with Resistance Advanced  - 2 x daily - 2-3 sets - 10 reps    Access Code: [email or web address]  URL: [email or web address]  Date: 07/28/2025  Prepared by: Arnaldo Hoskins    Exercises  - Standing Shoulder Single Arm PNF D2 Flexion with Anchored Resistance  - 3 x weekly - 2-3 sets - 10 reps  - Standing Single Arm Shoulder PNF D1 Flexion with Anchored Resistance  - 3 x weekly - 2-3 sets - 10 reps  - Standing Single Arm Shoulder External Rotation in Abduction with Anchored Resistance  - 3 x weekly - 2-3 sets - 10 reps  - Bird Dog  - 3 x weekly - 2-3 sets - 10 reps  - Prone Single Arm Shoulder Y  - 2-3 sets - 10 reps  - Prone Shoulder Horizontal Abduction  - 3 x weekly - 2-3 sets - 10 reps  - Prone Shoulder External Rotation  - 3 x weekly - 2-3 sets - 10 reps  - Single Arm Doorway Pec Stretch at 90 Degrees Abduction  - 1 x daily - 3 sets - 30 seconds hold  - Standing Shoulder Flexion Wall Walk  - 1 x daily - 10 reps - 5-10 hold  - Standing Shoulder Abduction Slides at Wall  - 1 x daily - 10 reps - 5-10 seconds hold                              Goals:  Resolved        PT Problem       PT Goal 1 (Progressing)       Start:  05/09/25    Expected End:  08/09/25    Resolved:  07/28/25    Pt will increase R UE strength to 4+/5 or greater in order to improve tolerance to functional activities and safely return to work as a .          PT Goal 2 (Progressing)       Start:  05/09/25    Expected End:  08/09/25    Resolved:  07/28/25    Pt will increase R shoulder ROM to WNL in order to reduce pain and improve tolerance to functional mobility and safely RTW as a .          PT Goal 3 (Met)       Start:  05/09/25    Expected End:  08/09/25    Resolved:  07/02/25    Pt will decrease pain to 1/10 at worst, with pt able to manage symptoms with HEP, positioning and modalities independently.          PT Goal 4 (Met)       Start:  05/09/25    Expected End:  08/09/25    Resolved:  07/28/25    Pt will be able to teach back 90% of HEP in order to maintain gains made in PT.

## 2025-07-30 ENCOUNTER — APPOINTMENT (OUTPATIENT)
Dept: ORTHOPEDIC SURGERY | Age: 35
End: 2025-07-30
Payer: MEDICAID

## 2025-07-30 VITALS — BODY MASS INDEX: 28.29 KG/M2 | HEIGHT: 69 IN | WEIGHT: 191 LBS

## 2025-07-30 DIAGNOSIS — S43.431D GLENOID LABRAL TEAR, RIGHT, SUBSEQUENT ENCOUNTER: Primary | ICD-10-CM

## 2025-07-30 PROCEDURE — 99213 OFFICE O/P EST LOW 20 MIN: CPT | Performed by: STUDENT IN AN ORGANIZED HEALTH CARE EDUCATION/TRAINING PROGRAM

## 2025-07-30 PROCEDURE — 1036F TOBACCO NON-USER: CPT | Performed by: STUDENT IN AN ORGANIZED HEALTH CARE EDUCATION/TRAINING PROGRAM

## 2025-07-30 PROCEDURE — 3008F BODY MASS INDEX DOCD: CPT | Performed by: STUDENT IN AN ORGANIZED HEALTH CARE EDUCATION/TRAINING PROGRAM

## 2025-07-30 NOTE — PROGRESS NOTES
PRIMARY CARE PHYSICIAN: Balwinder Valencia MD    ORTHOPAEDIC POSTOPERATIVE VISIT    ASSESSMENT & PLAN    Impression: 35 y.o. female 3 months postop s/p Right shoulder arthroscopy, anterior stabilization, labral repair, capsulorrhaphy, remplissage, extensive intra-articular debridement and synovectomy 04/18/2025    Plan:   Overall Ruthann continues to do very well. She has completed formal physical therapy and has transitioned to home exercises. Patient may slowly progress her activities as she tolerates.  We reviewed her postop precautions and she expressed understanding.  She will ice and rest the shoulder as she needs.     I reviewed their postoperative timeline and plan with them. They understand the postoperative precautions and the treatment plan going forward.     Follow-Up: Patient will follow-up as needed    At the end of the visit, all questions were answered in full. The patient is in agreement with the plan and recommendations. They will call the office with any questions/concerns.      Note dictated with [employer] software. Completed without full typed error editing and sent to avoid delay.      SUBJECTIVE  CHIEF COMPLAINT: Post-op    HPI: Ruthann Nichole is a 35 y.o. patient now 3 months postop status post Right shoulder arthroscopy, anterior stabilization, labral repair, capsulorrhaphy, remplissage, extensive intra-articular debridement and synovectomy 04/18/2025. She states she is doing well and has finished PT as of 7/28/25. Happy with her progress thus far. Feels her shoulder is stable. No concerns at this time.    REVIEW OF SYSTEMS  Constitutional: See HPI for pain assessment, No significant recent weight gain or loss, no fever or chills  Cardiovascular: No chest pain, shortness of breath  Respiratory: No difficulty breathing, no cough  Gastrointestinal: No nausea, vomiting, diarrhea, constipation  Musculoskeletal: Noted in HPI, positive for pain, restricted motion,  "stiffness  Integumentary: No rashes, easy bruising or skin lesions  Neurological: No headache, no numbness or tingling in extremities  Psychiatric: No mood changes or memory changes. No social issues  Heme/Lymph: No excessive swelling, easy bruising or excessive bleeding  ENT: No hearing changes, no nosebleeds  Eyes: No vision changes    CURRENT MEDICATIONS:   Current Medications[1]     OBJECTIVE    PHYSICAL EXAM      4/18/2025     1:05 PM 4/18/2025     1:20 PM 4/18/2025     1:21 PM 4/18/2025     1:55 PM 4/30/2025     9:15 AM 6/26/2025    10:15 AM 7/3/2025    11:12 AM   Vitals   Systolic 109 111 109 117  108 124   Diastolic 65 63 69 71  70 82   Heart Rate 96 81 81 67   50   Temp  36.8 °C (98.2 °F) 36.8 °C (98.2 °F) 36.7 °C (98.1 °F)   36.3 °C (97.4 °F)   Resp 16 16 16       Height     1.753 m (5' 9\") 1.753 m (5' 9\")    Weight (lb)     202 193 191   BMI     29.83 kg/m2 28.5 kg/m2 28.21 kg/m2   BSA (m2)     2.11 m2 2.06 m2 2.05 m2   Visit Report     Report Report Report      There is no height or weight on file to calculate BMI.    General: Well-appearing, no acute distress    Skin intact bilateral upper and lower extremities  No erythema  No warmth    Detailed examination of the right shoulder demonstrates:  Surgical incisions well-healed  No erythema or warmth  No ecchymosis  Resolving swelling, minimal  Biceps contour normal  Shoulder range of motion: Forward flexion 140, ER 30  Good resistance with Jobes, ER, Belly press testing  Upper extremity motor grossly intact  C5-T1 sensation intact bilaterally  2+ radial pulses bilaterally  Warm and well-perfused, brisk capillary refill    IMAGING:   No new imaging         [1]   Current Outpatient Medications   Medication Sig Dispense Refill    cyanocobalamin (Vitamin B-12) 1,000 mcg tablet Take 1 tablet (1,000 mcg) by mouth 4 times a week.      desonide (DesOwen) 0.05 % cream Apply topically 2 times a day. Apply to affected area 60 g 2    inhalational spacing device " inhaler Use as directed with inhalers 1 each 0    Symbicort 160-4.5 mcg/actuation inhaler Inhale 2 puffs 2 times a day. Rinse mouth with water after use to reduce aftertaste and incidence of candidiasis. Do not swallow. 10.2 g 11    tirzepatide (Mounjaro) 15 mg/0.5 mL pen injector Inject 15 mg under the skin 1 (one) time per week. Compound with B12 1000 mcg weekly 2 mL 3    triamcinolone (Kenalog) 0.1 % cream Apply topically 2 times a day. Apply to affected area 1-2 times daily as needed. 80 g 1     No current facility-administered medications for this visit.

## 2025-08-07 ENCOUNTER — TELEPHONE (OUTPATIENT)
Dept: OBSTETRICS AND GYNECOLOGY | Facility: CLINIC | Age: 35
End: 2025-08-07
Payer: MEDICAID

## 2025-08-07 DIAGNOSIS — R35.0 URINARY FREQUENCY: Primary | ICD-10-CM

## 2025-08-07 NOTE — TELEPHONE ENCOUNTER
Patient states she has another UTI, having back/bladder pain, frequent urination. She is asking if an antibiotic can be sent to pharmacy?

## 2025-08-07 NOTE — TELEPHONE ENCOUNTER
Patient needs to drop off a culture at the lab and then we can start an antibiotic after she does the sample

## 2025-08-10 LAB — BACTERIA UR CULT: NORMAL

## 2025-08-11 ENCOUNTER — TELEPHONE (OUTPATIENT)
Dept: OBSTETRICS AND GYNECOLOGY | Facility: CLINIC | Age: 35
End: 2025-08-11
Payer: MEDICAID

## 2025-08-11 ENCOUNTER — RESULTS FOLLOW-UP (OUTPATIENT)
Dept: OBSTETRICS AND GYNECOLOGY | Facility: CLINIC | Age: 35
End: 2025-08-11
Payer: MEDICAID

## 2025-08-11 DIAGNOSIS — R35.0 URINARY FREQUENCY: Primary | ICD-10-CM

## 2025-08-11 RX ORDER — NITROFURANTOIN 25; 75 MG/1; MG/1
100 CAPSULE ORAL 2 TIMES DAILY
Qty: 14 CAPSULE | Refills: 0 | Status: SHIPPED | OUTPATIENT
Start: 2025-08-11 | End: 2025-08-18

## 2026-01-05 ENCOUNTER — APPOINTMENT (OUTPATIENT)
Dept: PRIMARY CARE | Facility: CLINIC | Age: 36
End: 2026-01-05
Payer: MEDICAID

## 2026-07-02 ENCOUNTER — APPOINTMENT (OUTPATIENT)
Dept: OBSTETRICS AND GYNECOLOGY | Facility: CLINIC | Age: 36
End: 2026-07-02
Payer: MEDICAID

## (undated) DEVICE — GLOVE, SURGICAL, PROTEXIS PI BLUE W/NEUTHERA, 8.0, PF, LF

## (undated) DEVICE — GLOVE, PROTEXIS PI CLASSIC, SZ-6.5, PF, LF

## (undated) DEVICE — GOWN, SURGICAL, SIRUS, NON REINFORCED, LARGE

## (undated) DEVICE — TUBING, SUCTION, 6MM X 10, CLEAN N-COND

## (undated) DEVICE — GLOVE, SURGICAL, PROTEXIS PI BLUE W/NEUTHERA, 6.5, PF, LF

## (undated) DEVICE — Device

## (undated) DEVICE — TUBING, PATIENT 8FT STERILE

## (undated) DEVICE — SLEEVE, STAR, VELCRO

## (undated) DEVICE — SUTURELASSO, 45 DEGREE CRV RIGHT

## (undated) DEVICE — GLOVE, SURGICAL, PROTEXIS PI , 7.5, PF, LF

## (undated) DEVICE — SUTURE, VICRYL, 2-0, 27 IN, SH, UNDYED

## (undated) DEVICE — PROBE, APOLLO RF, 90 DEG, EXTRA LARTGE

## (undated) DEVICE — KIT, HIP, FOR 1.8MM Q-FIX IMP, DISP

## (undated) DEVICE — STRIP, SKIN CLOSURE, STERI STRIP, REINFORCED, 0.5 X 4 IN

## (undated) DEVICE — BONECUTTER, COOLCUT TORPEDO, 4.0MM X 13CM

## (undated) DEVICE — PENCIL, ELECTROSURG, W/BUTTON SWITCH & HOLSTER, EZ CLEAN, DISP

## (undated) DEVICE — DRESSING, GAUZE, SPONGE, 16 PLY, CURITY, 4 X 4 IN, BULK, NS

## (undated) DEVICE — SUTURE, MONOCRYL, 3-0, 27 IN, PS-2, UNDYED

## (undated) DEVICE — DRESSING, TRANSPARENT, TEGADERM, FRAME STYLE, 4 X 4.5, STRL

## (undated) DEVICE — CANNULA, TRIPLE-DAM TWIST-IN, 7MM X 7CM, VALVED